# Patient Record
Sex: FEMALE | Race: WHITE | HISPANIC OR LATINO | Employment: UNEMPLOYED | URBAN - METROPOLITAN AREA
[De-identification: names, ages, dates, MRNs, and addresses within clinical notes are randomized per-mention and may not be internally consistent; named-entity substitution may affect disease eponyms.]

---

## 2019-10-21 ENCOUNTER — INITIAL PRENATAL (OUTPATIENT)
Dept: OBSTETRICS AND GYNECOLOGY | Facility: CLINIC | Age: 34
End: 2019-10-21
Payer: MEDICAID

## 2019-10-21 VITALS — SYSTOLIC BLOOD PRESSURE: 124 MMHG | DIASTOLIC BLOOD PRESSURE: 82 MMHG

## 2019-10-21 DIAGNOSIS — Z3A.12 12 WEEKS GESTATION OF PREGNANCY: ICD-10-CM

## 2019-10-21 DIAGNOSIS — O26.91 PREGNANCY, COMPLICATIONS OF, FIRST TRIMESTER: Primary | ICD-10-CM

## 2019-10-21 DIAGNOSIS — O34.219 PREVIOUS CESAREAN SECTION COMPLICATING PREGNANCY: Primary | ICD-10-CM

## 2019-10-21 PROCEDURE — 99999 PR PBB SHADOW E&M-NEW PATIENT-LVL III: CPT | Mod: PBBFAC,,, | Performed by: OBSTETRICS & GYNECOLOGY

## 2019-10-21 PROCEDURE — 99204 PR OFFICE/OUTPT VISIT, NEW, LEVL IV, 45-59 MIN: ICD-10-PCS | Mod: TH,S$PBB,, | Performed by: OBSTETRICS & GYNECOLOGY

## 2019-10-21 PROCEDURE — 99999 PR PBB SHADOW E&M-NEW PATIENT-LVL III: ICD-10-PCS | Mod: PBBFAC,,, | Performed by: OBSTETRICS & GYNECOLOGY

## 2019-10-21 PROCEDURE — 99204 OFFICE O/P NEW MOD 45 MIN: CPT | Mod: TH,S$PBB,, | Performed by: OBSTETRICS & GYNECOLOGY

## 2019-10-21 PROCEDURE — 99203 OFFICE O/P NEW LOW 30 MIN: CPT | Mod: PBBFAC,TH | Performed by: OBSTETRICS & GYNECOLOGY

## 2019-10-21 RX ORDER — FOLIC ACID 1 MG/1
1 TABLET ORAL DAILY
Status: ON HOLD | COMMUNITY
End: 2020-04-05 | Stop reason: HOSPADM

## 2019-10-21 NOTE — PROGRESS NOTES
Pt had confirmation and PN labs at [facility] in [place].  Pt desires to continue care there as long as possible.  Pt needs dating sono.  Pt states she kept track of her LMP on her phone and feels she is sure of the date.  Preg complicated by prior Csec done for lack of dilation in [place].  Pt denies any current health problems.  Pt denies any complication with prior preg.  Radhika interpretor used.    Dating sono scheduled for tomorrow  Anatomy scan scheduled for 7 wks from now  Pt given return appt to this clinic with me at 28 wks

## 2019-10-22 ENCOUNTER — EVALUATION (OUTPATIENT)
Dept: MATERNAL FETAL MEDICINE | Facility: CLINIC | Age: 34
End: 2019-10-22
Payer: MEDICAID

## 2019-10-22 DIAGNOSIS — Z36.89 ENCOUNTER TO ESTABLISH GESTATIONAL AGE USING ULTRASOUND: ICD-10-CM

## 2019-10-22 DIAGNOSIS — O26.91 PREGNANCY, COMPLICATIONS OF, FIRST TRIMESTER: ICD-10-CM

## 2019-10-22 PROCEDURE — 76801 OB US < 14 WKS SINGLE FETUS: CPT | Mod: 26,S$PBB,, | Performed by: OBSTETRICS & GYNECOLOGY

## 2019-10-22 PROCEDURE — 76801 PR US, OB <14WKS, TRANSABD, SINGLE GESTATION: ICD-10-PCS | Mod: 26,S$PBB,, | Performed by: OBSTETRICS & GYNECOLOGY

## 2019-10-22 PROCEDURE — 99499 UNLISTED E&M SERVICE: CPT | Mod: S$PBB,,, | Performed by: OBSTETRICS & GYNECOLOGY

## 2019-10-22 PROCEDURE — 76801 OB US < 14 WKS SINGLE FETUS: CPT | Mod: PBBFAC,PO | Performed by: OBSTETRICS & GYNECOLOGY

## 2019-10-22 PROCEDURE — 99499 NO LOS: ICD-10-PCS | Mod: S$PBB,,, | Performed by: OBSTETRICS & GYNECOLOGY

## 2019-12-10 ENCOUNTER — PROCEDURE VISIT (OUTPATIENT)
Dept: MATERNAL FETAL MEDICINE | Facility: CLINIC | Age: 34
End: 2019-12-10
Payer: MEDICAID

## 2019-12-10 DIAGNOSIS — Z36.89 ENCOUNTER FOR FETAL ANATOMIC SURVEY: Primary | ICD-10-CM

## 2019-12-10 DIAGNOSIS — O34.219 PREVIOUS CESAREAN SECTION COMPLICATING PREGNANCY: ICD-10-CM

## 2019-12-10 PROCEDURE — 76811 OB US DETAILED SNGL FETUS: CPT | Mod: PBBFAC,PO | Performed by: PEDIATRICS

## 2019-12-10 PROCEDURE — 99499 UNLISTED E&M SERVICE: CPT | Mod: S$PBB,,, | Performed by: PEDIATRICS

## 2019-12-10 PROCEDURE — 99499 NO LOS: ICD-10-PCS | Mod: S$PBB,,, | Performed by: PEDIATRICS

## 2019-12-10 PROCEDURE — 76811 PR US, OB FETAL EVAL & EXAM, TRANSABDOM,FIRST GESTATION: ICD-10-PCS | Mod: 26,S$PBB,, | Performed by: PEDIATRICS

## 2019-12-10 PROCEDURE — 76811 OB US DETAILED SNGL FETUS: CPT | Mod: 26,S$PBB,, | Performed by: PEDIATRICS

## 2020-01-13 ENCOUNTER — PROCEDURE VISIT (OUTPATIENT)
Dept: MATERNAL FETAL MEDICINE | Facility: CLINIC | Age: 35
End: 2020-01-13
Payer: MEDICAID

## 2020-01-13 VITALS — WEIGHT: 194.25 LBS | DIASTOLIC BLOOD PRESSURE: 84 MMHG | SYSTOLIC BLOOD PRESSURE: 130 MMHG

## 2020-01-13 DIAGNOSIS — O35.EXX0 FETAL RENAL ANOMALY, SINGLE GESTATION: ICD-10-CM

## 2020-01-13 DIAGNOSIS — Z36.89 ENCOUNTER FOR FETAL ANATOMIC SURVEY: ICD-10-CM

## 2020-01-13 PROCEDURE — 76816 PR  US,PREGNANT UTERUS,F/U,TRANSABD APP: ICD-10-PCS | Mod: 26,S$PBB,, | Performed by: OBSTETRICS & GYNECOLOGY

## 2020-01-13 PROCEDURE — 99213 PR OFFICE/OUTPT VISIT, EST, LEVL III, 20-29 MIN: ICD-10-PCS | Mod: 25,S$PBB,TH, | Performed by: OBSTETRICS & GYNECOLOGY

## 2020-01-13 PROCEDURE — 76816 OB US FOLLOW-UP PER FETUS: CPT | Mod: PBBFAC,PO | Performed by: OBSTETRICS & GYNECOLOGY

## 2020-01-13 PROCEDURE — 76816 OB US FOLLOW-UP PER FETUS: CPT | Mod: 26,S$PBB,, | Performed by: OBSTETRICS & GYNECOLOGY

## 2020-01-13 PROCEDURE — 99213 OFFICE O/P EST LOW 20 MIN: CPT | Mod: 25,S$PBB,TH, | Performed by: OBSTETRICS & GYNECOLOGY

## 2020-01-13 NOTE — PROGRESS NOTES
Indication  ========    Follow-up evaluation of anatomy. Evaluation of fetal growth    Pregnancy History  ==============    No labs on file    Maternal Assessment  =================    Weight 88 kg  Weight (lb) 194 lb  BP syst 130 mmHg  BP diast 84 mmHg    Method  ======    2D Color Doppler, Voluson E6, Transabdominal ultrasound examination. View: Suboptimal view: limited by fetal position    Pregnancy  =========    Mcgee pregnancy. Number of fetuses: 1    Dating  ======    LMP on: 7/29/2019  Cycle: regular cycle,  GA by LMP 24 w + 0 d  JACOB by LMP: 5/4/2020  Ultrasound examination on: 1/13/2020  GA by U/S based upon: AC, BPD, Femur, HC  GA by U/S 24 w + 1 d  JACOB by U/S: 5/3/2020  Assigned: based on ultrasound (CRL), selected on 10/22/2019  Assigned GA 23 w + 6 d  Assigned JACOB: 5/5/2020  Pregnancy length 280 d    General Evaluation  ==============    Cardiac activity present.  bpm.  Fetal movements visualized.  Presentation cephalic.  Placenta anterior.  Umbilical cord 3 vessel cord.  Amniotic fluid MVP 6.3 cm.    Fetal Biometry  ============    Standard  BPD 53.4 mm  22w 2d                Hadlock    OFD 78.4 mm  25w 4d                Kenneth    .2 mm  23w 5d                Hadlock    .6 mm  25w 2d                Hadlock    Femur 46.5 mm  25w 3d                Hadlock    Humerus 42.5 mm  25w 4d                Kenneth    HC / AC 1.04     g          67%        Idris    EFW (lb) 1 lb  EFW (oz) 11 oz  EFW by: Hadlock (BPD-HC-AC-FL)  Extended  Lt Renal pelvis ap 9.6 mm    Head / Face / Neck  Cephalic index 0.68    Extremities / Bony Struc  FL / BPD 0.87    FL / HC 0.22    FL / AC 0.22    Other Structures   bpm    Fetal Anatomy  ===========    Cranium: normal  Lips: suboptimal  Nose: suboptimal  4-chamber view: normal  RVOT view: suboptimal  LVOT view: suboptimal  Heart / Thorax  Aortic arch view: suboptimal  Ductal arch view: normal  SVC: normal  IVC: normal  3-vessel  view: normal  3-vessel-trachea view: suboptimal  Rt lung: normal  Lt lung: normal  Diaphragm: normal  Stomach: normal  Bladder: normal  Abdomen  Rt kidney: normal  Lt kidney: abnormal  Lt kidney: hydronephrosis  Cervical spine: normal  Thoracic spine: normal  Lumbar spine: normal  Sacral spine: normal  Lt arm: normal  Lt hand: normal  Extremities / Skeleton: Position of Left hand: WNL  Gender: female  Wants to know gender: yes  Other: A full anatomic survey has been previously performed.    Consultation  ==========    Type: ultrasound findings  The patient indicated that she was unaware of the pyelectasis seen on the prior ultrasound (left 5.1 mm). Today, unilateral fetal hydronephrosis  was noted on the fetal left measuring 9.6 mm with some calyceal dilation. The right kidney appears normal amd the fetal bladder appears  normal. The amniotic fluid volume is normal. This pelvic dilation is worse than on the prior ultrasound. I discussed with the patient that  hydronephrosis can occur for various reasons including but not limited to reflux, obstruction, fetal renal abnormality, chromosomal or genetic  condition. The patient had low risk screening for aneuploidy that was scanned in under the media tab. This makes trisomy 21 less likely. I  discussed with her the potential for the kidney dilation to stay the same, worsen or improve. Given the fact that it has worsened from the prior  scan and is more consistent with hydronephrosis, I would anticipate that the  will need follow up post delivery. I discussed with her that  we will reassess the fetal kidney in approximately 4-5 weeks. If there is still evidence of hydronephrosis, am  urology appointment  could be considered if the patient desires. If this dilation persists as pregnancy progresses,  follow-up would be warranted. A   ultrasound would be recommended prior to discharge from the nursery as well as appropriate follow-up with  pediatric urology and possible  antibiotic prophylaxis. The primary OB should notify the pediatricians at delivery if this persists. Additionally, the patient is aware that at time  surgical intervention is required or other imaging studies may be needed. There can be a potential affect on kidney function. 25 min was spent  face-to-face time with greater than half that time spent in counseling and coordination of care. An  was used for the consultation.    Impression  =========    Mcgee live intrauterine pregnancy.  Overall normal fetal growth.  Normal amniotic fluid volume.  Left fetal hydronephrosis-renal pelvis measures 9.6 mm and there is calyceal dilation. Right kidney appears normal. Fetal bladder appears  normal.  Some of the fetal anatomy was suboptimally visualized. The remainder of the fetal anatomy that was seen appeared normal.  Placenta is anterior with some lakes. No previa noted on prior ultrasound.    Recommendation  ==============    Follow up ultrasound in 4-5 weeks.  If hydronephrosis persists, primary ob to notify pediatricians at delivery so that  follow up can be performed.  See note above.

## 2020-02-10 ENCOUNTER — ROUTINE PRENATAL (OUTPATIENT)
Dept: OBSTETRICS AND GYNECOLOGY | Facility: CLINIC | Age: 35
End: 2020-02-10
Payer: MEDICAID

## 2020-02-10 VITALS — SYSTOLIC BLOOD PRESSURE: 126 MMHG | WEIGHT: 196 LBS | DIASTOLIC BLOOD PRESSURE: 81 MMHG

## 2020-02-10 DIAGNOSIS — O34.219 HISTORY OF CESAREAN SECTION COMPLICATING PREGNANCY: ICD-10-CM

## 2020-02-10 DIAGNOSIS — Z3A.28 28 WEEKS GESTATION OF PREGNANCY: Primary | ICD-10-CM

## 2020-02-10 PROCEDURE — 99213 PR OFFICE/OUTPT VISIT, EST, LEVL III, 20-29 MIN: ICD-10-PCS | Mod: TH,S$PBB,, | Performed by: OBSTETRICS & GYNECOLOGY

## 2020-02-10 PROCEDURE — 99213 OFFICE O/P EST LOW 20 MIN: CPT | Mod: TH,S$PBB,, | Performed by: OBSTETRICS & GYNECOLOGY

## 2020-02-10 PROCEDURE — 99999 PR PBB SHADOW E&M-EST. PATIENT-LVL II: CPT | Mod: PBBFAC,,, | Performed by: OBSTETRICS & GYNECOLOGY

## 2020-02-10 PROCEDURE — 99212 OFFICE O/P EST SF 10 MIN: CPT | Mod: PBBFAC,TH | Performed by: OBSTETRICS & GYNECOLOGY

## 2020-02-10 PROCEDURE — 99999 PR PBB SHADOW E&M-EST. PATIENT-LVL II: ICD-10-PCS | Mod: PBBFAC,,, | Performed by: OBSTETRICS & GYNECOLOGY

## 2020-02-10 NOTE — PROGRESS NOTES
Routine OB, patient has no complaints. Protien 3+  Patient is aware that she needs to complete her Glucose test

## 2020-02-12 ENCOUNTER — LAB VISIT (OUTPATIENT)
Dept: LAB | Facility: HOSPITAL | Age: 35
End: 2020-02-12
Attending: OBSTETRICS & GYNECOLOGY
Payer: MEDICAID

## 2020-02-12 ENCOUNTER — PROCEDURE VISIT (OUTPATIENT)
Dept: MATERNAL FETAL MEDICINE | Facility: CLINIC | Age: 35
End: 2020-02-12
Payer: MEDICAID

## 2020-02-12 VITALS — SYSTOLIC BLOOD PRESSURE: 124 MMHG | DIASTOLIC BLOOD PRESSURE: 84 MMHG | WEIGHT: 195.56 LBS

## 2020-02-12 DIAGNOSIS — Z3A.28 28 WEEKS GESTATION OF PREGNANCY: ICD-10-CM

## 2020-02-12 LAB
ABO + RH BLD: NORMAL
BASOPHILS # BLD AUTO: 0.08 K/UL (ref 0–0.2)
BASOPHILS NFR BLD: 0.7 % (ref 0–1.9)
BLD GP AB SCN CELLS X3 SERPL QL: NORMAL
DIFFERENTIAL METHOD: ABNORMAL
EOSINOPHIL # BLD AUTO: 0.1 K/UL (ref 0–0.5)
EOSINOPHIL NFR BLD: 0.7 % (ref 0–8)
ERYTHROCYTE [DISTWIDTH] IN BLOOD BY AUTOMATED COUNT: 13.4 % (ref 11.5–14.5)
GLUCOSE SERPL-MCNC: 246 MG/DL (ref 70–140)
HCT VFR BLD AUTO: 38.6 % (ref 37–48.5)
HGB BLD-MCNC: 12.8 G/DL (ref 12–16)
IMM GRANULOCYTES # BLD AUTO: 0.43 K/UL (ref 0–0.04)
IMM GRANULOCYTES NFR BLD AUTO: 3.6 % (ref 0–0.5)
LYMPHOCYTES # BLD AUTO: 2.6 K/UL (ref 1–4.8)
LYMPHOCYTES NFR BLD: 21.4 % (ref 18–48)
MCH RBC QN AUTO: 30.5 PG (ref 27–31)
MCHC RBC AUTO-ENTMCNC: 33.2 G/DL (ref 32–36)
MCV RBC AUTO: 92 FL (ref 82–98)
MONOCYTES # BLD AUTO: 0.7 K/UL (ref 0.3–1)
MONOCYTES NFR BLD: 5.5 % (ref 4–15)
NEUTROPHILS # BLD AUTO: 8.1 K/UL (ref 1.8–7.7)
NEUTROPHILS NFR BLD: 68.1 % (ref 38–73)
NRBC BLD-RTO: 0 /100 WBC
PLATELET # BLD AUTO: 262 K/UL (ref 150–350)
PMV BLD AUTO: 8.9 FL (ref 9.2–12.9)
RBC # BLD AUTO: 4.2 M/UL (ref 4–5.4)
WBC # BLD AUTO: 11.94 K/UL (ref 3.9–12.7)

## 2020-02-12 PROCEDURE — 85025 COMPLETE CBC W/AUTO DIFF WBC: CPT

## 2020-02-12 PROCEDURE — 86850 RBC ANTIBODY SCREEN: CPT

## 2020-02-12 PROCEDURE — 76816 PR  US,PREGNANT UTERUS,F/U,TRANSABD APP: ICD-10-PCS | Mod: 26,S$PBB,, | Performed by: PEDIATRICS

## 2020-02-12 PROCEDURE — 76816 OB US FOLLOW-UP PER FETUS: CPT | Mod: PBBFAC,PO | Performed by: PEDIATRICS

## 2020-02-12 PROCEDURE — 82950 GLUCOSE TEST: CPT

## 2020-02-12 PROCEDURE — 99214 PR OFFICE/OUTPT VISIT, EST, LEVL IV, 30-39 MIN: ICD-10-PCS | Mod: TH,25,S$PBB, | Performed by: PEDIATRICS

## 2020-02-12 PROCEDURE — 76816 OB US FOLLOW-UP PER FETUS: CPT | Mod: 26,S$PBB,, | Performed by: PEDIATRICS

## 2020-02-12 PROCEDURE — 36415 COLL VENOUS BLD VENIPUNCTURE: CPT

## 2020-02-12 PROCEDURE — 99214 OFFICE O/P EST MOD 30 MIN: CPT | Mod: TH,25,S$PBB, | Performed by: PEDIATRICS

## 2020-02-12 NOTE — PROGRESS NOTES
Indication  ========    Evaluation of fetal growth. Follow-up evaluation of anatomy    Pregnancy History  ==============    No labs on file    Maternal Assessment  =================    Weight 88 kg  Weight (lb) 194 lb  BP syst 145 mmHg  BP diast 77 mmHg  Maternal assessment other: Repeat BP: 124/84    Method  ======    Transabdominal ultrasound examination, 2D Color Doppler, Voluson E6. View: Suboptimal view: limited by fetal position    Pregnancy  =========    Mcgee pregnancy. Number of fetuses: 1    Dating  ======    LMP on: 7/29/2019  Cycle: regular cycle,  GA by LMP 28 w + 2 d  JACOB by LMP: 5/4/2020  Ultrasound examination on: 2/12/2020  GA by U/S based upon: AC, BPD, Femur, HC  GA by U/S 29 w + 2 d  JACOB by U/S: 4/27/2020  Assigned: based on ultrasound (CRL), selected on 10/22/2019  Assigned GA 28 w + 1 d  Assigned JACOB: 5/5/2020  Pregnancy length 280 d    General Evaluation  ==============    Cardiac activity present.  bpm.  Fetal movements visualized.  Presentation breech.  Placenta anterior.  Umbilical cord 3 vessel cord.  Amniotic fluid MVP 5.5 cm.    Fetal Biometry  ============    Standard  BPD 67.0 mm  27w 0d                Hadlock    OFD 97.1 mm  31w 3d                Kenneth    .7 mm  29w 5d                Hadlock    .1 mm  30w 1d                Hadlock    Femur 57.1 mm  30w 0d                Hadlock    HC / AC 1.04    EFW 1,455 g          64%        Idris    EFW (lb) 3 lb  EFW (oz) 3 oz  EFW by: Hadlock (BPD-HC-AC-FL)  Extended  Lt Renal pelvis ap 15.8 mm    Head / Face / Neck  Cephalic index 0.69    Extremities / Bony Struc  FL / BPD 0.85    FL / HC 0.21    FL / AC 0.22    Other Structures   bpm    Fetal Anatomy  ============    Cranium: normal  Lips: normal  Nose: normal  4-chamber view: documented previously  RVOT view: suboptimal  LVOT view: suboptimal  Heart / Thorax  Aortic arch view: normal  3-vessel-trachea  view: normal  Stomach: normal  Bladder: normal  Abdomen  Rt kidney: normal  Lt kidney: abnormal  Lt kidney: hydronephrosis  Gender: female  Wants to know gender: yes    Consultation  ==========    Ms. Champ King returns today for re-evaluation of fetal growth and left kidney. Completion of anatomy was also attempted but some cardiac  views were not obtained. Pyelectasis was noted on a read only scan in December. The patient returned to our office on  -see Dr. Wahl's consult. The patient did not know that she had pyelectasis. on the January scan, the left kidney was noted to have hydronephrosis.  Today, it has progressed. The renal pelvis measures 15.8 mm. There is calyceal involvement but no obvious signs of dysplastic changes as of  this time.    Utilizing an , I discussed the findings with Ms. King. She understands from her previous discussion that hydronephrosis rarely  regresses and that her baby is likely to require intervention postnatally. Prenatal appointment with pediatric urology/nephrology is discouraged  by those departments. I explained this to Ms. King, and she understands that she will need to have her pediatrician and neonatology involved  in the  assessment which will then require a consultation with Pediatric Urology. I did explain to her that the right kidney appears  normal and healthy. She understands that only 1 healthy kidney is required for a healthy existence. I also explained to her that her history of  renal infections was not the cause as of the fetal disorder.    All questions were answered, and the patient appeared to be satisfied with her information. She will return in approximately 6 weeks for growth  and additional evaluation of fetal renal status.      I spent 25 min in patient care management and consultation with greater than 50% face-to-face.      Impression  =========    Fetal size is AGA with the EFW at the 64th percentile. Cardiac outflow  tracts still NWS.  Normal repeat limited fetal anatomic survey.    AFV is normal.      Recommendation  ==============    Repeat growth and anatomical assessment (cardiac views and left kidney) in 5 - 6 weeks'  Notify Pediatrician/Neonatology of  assessment needs

## 2020-02-18 ENCOUNTER — ROUTINE PRENATAL (OUTPATIENT)
Dept: OBSTETRICS AND GYNECOLOGY | Facility: CLINIC | Age: 35
End: 2020-02-18
Payer: MEDICAID

## 2020-02-18 VITALS — SYSTOLIC BLOOD PRESSURE: 132 MMHG | WEIGHT: 196 LBS | DIASTOLIC BLOOD PRESSURE: 83 MMHG

## 2020-02-18 DIAGNOSIS — O24.419 GESTATIONAL DIABETES MELLITUS (GDM) AFFECTING SECOND PREGNANCY: ICD-10-CM

## 2020-02-18 DIAGNOSIS — Z3A.29 29 WEEKS GESTATION OF PREGNANCY: ICD-10-CM

## 2020-02-18 DIAGNOSIS — O34.219 HISTORY OF CESAREAN SECTION COMPLICATING PREGNANCY: Primary | ICD-10-CM

## 2020-02-18 PROCEDURE — 99213 PR OFFICE/OUTPT VISIT, EST, LEVL III, 20-29 MIN: ICD-10-PCS | Mod: TH,S$PBB,, | Performed by: OBSTETRICS & GYNECOLOGY

## 2020-02-18 PROCEDURE — 99999 PR PBB SHADOW E&M-EST. PATIENT-LVL II: CPT | Mod: PBBFAC,,, | Performed by: OBSTETRICS & GYNECOLOGY

## 2020-02-18 PROCEDURE — 99213 OFFICE O/P EST LOW 20 MIN: CPT | Mod: TH,S$PBB,, | Performed by: OBSTETRICS & GYNECOLOGY

## 2020-02-18 PROCEDURE — 99212 OFFICE O/P EST SF 10 MIN: CPT | Mod: PBBFAC,TH | Performed by: OBSTETRICS & GYNECOLOGY

## 2020-02-18 PROCEDURE — 99999 PR PBB SHADOW E&M-EST. PATIENT-LVL II: ICD-10-PCS | Mod: PBBFAC,,, | Performed by: OBSTETRICS & GYNECOLOGY

## 2020-02-18 NOTE — PROGRESS NOTES
Pt here to discuss recent 1 hr DM screen = 246.  Discussed 3 hr test not need to confirm Gest DM.  Discussed low carb diet; Rx and info for glucometer, lancets and testing strips qid.  Pt to log all BS results and f/u in 1 wk after modifying diet.

## 2020-02-21 ENCOUNTER — CLINICAL SUPPORT (OUTPATIENT)
Dept: OBSTETRICS AND GYNECOLOGY | Facility: CLINIC | Age: 35
End: 2020-02-21
Payer: MEDICAID

## 2020-02-21 ENCOUNTER — ROUTINE PRENATAL (OUTPATIENT)
Dept: OBSTETRICS AND GYNECOLOGY | Facility: CLINIC | Age: 35
End: 2020-02-21
Payer: MEDICAID

## 2020-02-21 VITALS
SYSTOLIC BLOOD PRESSURE: 134 MMHG | WEIGHT: 198 LBS | WEIGHT: 198 LBS | SYSTOLIC BLOOD PRESSURE: 134 MMHG | DIASTOLIC BLOOD PRESSURE: 82 MMHG | DIASTOLIC BLOOD PRESSURE: 82 MMHG

## 2020-02-21 DIAGNOSIS — Z3A.29 29 WEEKS GESTATION OF PREGNANCY: ICD-10-CM

## 2020-02-21 DIAGNOSIS — Z23 NEED FOR DIPHTHERIA-TETANUS-PERTUSSIS (TDAP) VACCINE: ICD-10-CM

## 2020-02-21 DIAGNOSIS — O24.415 GESTATIONAL DIABETES MELLITUS (GDM) IN THIRD TRIMESTER CONTROLLED ON ORAL HYPOGLYCEMIC DRUG: Primary | ICD-10-CM

## 2020-02-21 DIAGNOSIS — Z23 NEED FOR TDAP VACCINATION: Primary | ICD-10-CM

## 2020-02-21 PROCEDURE — 99999 PR PBB SHADOW E&M-EST. PATIENT-LVL II: CPT | Mod: PBBFAC,,,

## 2020-02-21 PROCEDURE — 90471 IMMUNIZATION ADMIN: CPT | Mod: PBBFAC,VFC

## 2020-02-21 PROCEDURE — 99212 OFFICE O/P EST SF 10 MIN: CPT | Mod: PBBFAC,25

## 2020-02-21 PROCEDURE — 99999 PR PBB SHADOW E&M-EST. PATIENT-LVL II: CPT | Mod: PBBFAC,,, | Performed by: OBSTETRICS & GYNECOLOGY

## 2020-02-21 PROCEDURE — 99999 PR PBB SHADOW E&M-EST. PATIENT-LVL II: ICD-10-PCS | Mod: PBBFAC,,, | Performed by: OBSTETRICS & GYNECOLOGY

## 2020-02-21 PROCEDURE — 99213 OFFICE O/P EST LOW 20 MIN: CPT | Mod: TH,S$PBB,, | Performed by: OBSTETRICS & GYNECOLOGY

## 2020-02-21 PROCEDURE — 99213 PR OFFICE/OUTPT VISIT, EST, LEVL III, 20-29 MIN: ICD-10-PCS | Mod: TH,S$PBB,, | Performed by: OBSTETRICS & GYNECOLOGY

## 2020-02-21 PROCEDURE — 99999 PR PBB SHADOW E&M-EST. PATIENT-LVL II: ICD-10-PCS | Mod: PBBFAC,,,

## 2020-02-21 PROCEDURE — 99212 OFFICE O/P EST SF 10 MIN: CPT | Mod: PBBFAC,25,27,TH | Performed by: OBSTETRICS & GYNECOLOGY

## 2020-02-21 RX ORDER — METFORMIN HYDROCHLORIDE 500 MG/1
500 TABLET ORAL 2 TIMES DAILY WITH MEALS
Qty: 60 TABLET | Refills: 11 | Status: ON HOLD | OUTPATIENT
Start: 2020-02-21 | End: 2020-04-05 | Stop reason: HOSPADM

## 2020-02-21 NOTE — PROGRESS NOTES
PT'S ORIGINAL APPT WAS @  8307   , PT ARRIVED @  1420    , PT WAS ROOMED @  0666 ,, PT  HAS SEEN HER DR FIRST  Pt here for T- DAP injection, explained what the medication is for, reviewed documentation of medication with pt, injection given via L  Deltoid w/o difficulty. Pt stated her understanding of all instructions.

## 2020-02-21 NOTE — PROGRESS NOTES
Pt has been recording BS after changing diet; all fields elevated, will start metformin 500 mg bid.

## 2020-02-27 ENCOUNTER — TELEPHONE (OUTPATIENT)
Dept: MATERNAL FETAL MEDICINE | Facility: CLINIC | Age: 35
End: 2020-02-27

## 2020-02-27 DIAGNOSIS — O24.419 GESTATIONAL DIABETES MELLITUS (GDM) AFFECTING SECOND PREGNANCY: Primary | ICD-10-CM

## 2020-02-27 NOTE — TELEPHONE ENCOUNTER
With the assistance of an [facility] , discussed with patient that the appointments for 3/3/20 and 3/4/20 could not be combined for the same day. Patient requested to reschedule to the following Monday 3/9/20 and MFM consult rescheduled to 3/9/20 at 940a prior to Dr. Jordan's OB visit. Patient instructed to bring in BS log for review by MFM. Pt verbalized understanding of information.

## 2020-02-27 NOTE — TELEPHONE ENCOUNTER
Message left for pt to call MFM clinic at [phone] regarding MFM consult and OB visit for next week on different days. Dr. Jordan not in clinic on 3/4/20. Plan to confirm with patient that she can come on 2 separate days or reschedule if needed.

## 2020-03-03 ENCOUNTER — ROUTINE PRENATAL (OUTPATIENT)
Dept: OBSTETRICS AND GYNECOLOGY | Facility: CLINIC | Age: 35
End: 2020-03-03
Payer: MEDICAID

## 2020-03-03 VITALS — WEIGHT: 196 LBS | DIASTOLIC BLOOD PRESSURE: 88 MMHG | SYSTOLIC BLOOD PRESSURE: 125 MMHG

## 2020-03-03 DIAGNOSIS — O34.219 HISTORY OF CESAREAN SECTION COMPLICATING PREGNANCY: ICD-10-CM

## 2020-03-03 DIAGNOSIS — Z3A.31 31 WEEKS GESTATION OF PREGNANCY: ICD-10-CM

## 2020-03-03 DIAGNOSIS — O24.419 GESTATIONAL DIABETES MELLITUS (GDM) AFFECTING SECOND PREGNANCY: Primary | ICD-10-CM

## 2020-03-03 PROCEDURE — 99213 OFFICE O/P EST LOW 20 MIN: CPT | Mod: TH,S$PBB,, | Performed by: OBSTETRICS & GYNECOLOGY

## 2020-03-03 PROCEDURE — 99212 OFFICE O/P EST SF 10 MIN: CPT | Mod: PBBFAC,TH | Performed by: OBSTETRICS & GYNECOLOGY

## 2020-03-03 PROCEDURE — 99999 PR PBB SHADOW E&M-EST. PATIENT-LVL II: ICD-10-PCS | Mod: PBBFAC,,, | Performed by: OBSTETRICS & GYNECOLOGY

## 2020-03-03 PROCEDURE — 99999 PR PBB SHADOW E&M-EST. PATIENT-LVL II: CPT | Mod: PBBFAC,,, | Performed by: OBSTETRICS & GYNECOLOGY

## 2020-03-03 PROCEDURE — 99213 PR OFFICE/OUTPT VISIT, EST, LEVL III, 20-29 MIN: ICD-10-PCS | Mod: TH,S$PBB,, | Performed by: OBSTETRICS & GYNECOLOGY

## 2020-03-03 NOTE — PROGRESS NOTES
Pt denies any c/o; doing well, taking metformin 500 mg bid; overall blood sugar control is very good, very few abnormals since last visit

## 2020-03-09 ENCOUNTER — PROCEDURE VISIT (OUTPATIENT)
Dept: MATERNAL FETAL MEDICINE | Facility: CLINIC | Age: 35
End: 2020-03-09
Payer: MEDICAID

## 2020-03-09 ENCOUNTER — INITIAL CONSULT (OUTPATIENT)
Dept: MATERNAL FETAL MEDICINE | Facility: CLINIC | Age: 35
End: 2020-03-09
Payer: MEDICAID

## 2020-03-09 VITALS — DIASTOLIC BLOOD PRESSURE: 88 MMHG | SYSTOLIC BLOOD PRESSURE: 128 MMHG | WEIGHT: 200.63 LBS

## 2020-03-09 DIAGNOSIS — O24.419 GESTATIONAL DIABETES MELLITUS (GDM) AFFECTING SECOND PREGNANCY: ICD-10-CM

## 2020-03-09 DIAGNOSIS — O34.219 HISTORY OF CESAREAN SECTION COMPLICATING PREGNANCY: ICD-10-CM

## 2020-03-09 DIAGNOSIS — Z3A.29 29 WEEKS GESTATION OF PREGNANCY: ICD-10-CM

## 2020-03-09 PROCEDURE — 76816 OB US FOLLOW-UP PER FETUS: CPT | Mod: 26,S$PBB,, | Performed by: OBSTETRICS & GYNECOLOGY

## 2020-03-09 PROCEDURE — 76816 PR  US,PREGNANT UTERUS,F/U,TRANSABD APP: ICD-10-PCS | Mod: 26,S$PBB,, | Performed by: OBSTETRICS & GYNECOLOGY

## 2020-03-09 PROCEDURE — 76819 FETAL BIOPHYS PROFIL W/O NST: CPT | Mod: 26,S$PBB,, | Performed by: OBSTETRICS & GYNECOLOGY

## 2020-03-09 PROCEDURE — 99212 OFFICE O/P EST SF 10 MIN: CPT | Mod: PBBFAC,TH,PO,25 | Performed by: OBSTETRICS & GYNECOLOGY

## 2020-03-09 PROCEDURE — 76819 FETAL BIOPHYS PROFIL W/O NST: CPT | Mod: PBBFAC,PO | Performed by: OBSTETRICS & GYNECOLOGY

## 2020-03-09 PROCEDURE — 76819 PR US, OB, FETAL BIOPHYSICAL, W/O NST: ICD-10-PCS | Mod: 26,S$PBB,, | Performed by: OBSTETRICS & GYNECOLOGY

## 2020-03-09 PROCEDURE — 76816 OB US FOLLOW-UP PER FETUS: CPT | Mod: PBBFAC,PO | Performed by: OBSTETRICS & GYNECOLOGY

## 2020-03-09 PROCEDURE — 99214 PR OFFICE/OUTPT VISIT, EST, LEVL IV, 30-39 MIN: ICD-10-PCS | Mod: S$PBB,TH,25, | Performed by: OBSTETRICS & GYNECOLOGY

## 2020-03-09 PROCEDURE — 99999 PR PBB SHADOW E&M-EST. PATIENT-LVL II: ICD-10-PCS | Mod: PBBFAC,,, | Performed by: OBSTETRICS & GYNECOLOGY

## 2020-03-09 PROCEDURE — 99999 PR PBB SHADOW E&M-EST. PATIENT-LVL II: CPT | Mod: PBBFAC,,, | Performed by: OBSTETRICS & GYNECOLOGY

## 2020-03-09 PROCEDURE — 99214 OFFICE O/P EST MOD 30 MIN: CPT | Mod: S$PBB,TH,25, | Performed by: OBSTETRICS & GYNECOLOGY

## 2020-03-09 NOTE — PROGRESS NOTES
Chief complaint: Consultation for gestational diabetes  Provider requesting consultation: Dr. Jordan    34 y.o. [id number] 31w6d EGA referred for consultation regarding GDM. 1 hour was 246. Patient was instructed to check blood sugars by primary OB. Started on metformin 500 mg BID by primary OB.    Review of Systems  Review of Systems       Medication List with Changes/Refills   Current Medications    FOLIC ACID (FOLVITE) 1 MG TABLET    Take 1 mg by mouth once daily.    METFORMIN (GLUCOPHAGE) 500 MG TABLET    Take 1 tablet (500 mg total) by mouth 2 (two) times daily with meals.    PRENATAL 25/IRON FUM/FOLIC/DHA (PRENATAL-1 ORAL)    Take by mouth.     Review of patient's allergies indicates:  No Known Allergies    PMH:No past medical history on file.    PObHx:  OB History    Para Term  AB Living   2 1 1     1   SAB TAB Ectopic Multiple Live Births           1      # Outcome Date GA Lbr Hermann/2nd Weight Sex Delivery Anes PTL Lv   2 Current            1 Term 09   4.082 kg (9 lb) M CS-LTranv   VIV       PSH:  Past Surgical History:   Procedure Laterality Date     SECTION         Family history:family history includes Diabetes in her mother.    Social history: reports that she has never smoked. She has never used smokeless tobacco. She reports that she does not drink alcohol or use drugs.    Genetic history: Quad low risk.    Objective:    Physical Exam      Deferred.    F/u fetal growth and BPP performed today. EFW 44th percentile, BPP 8/8. Normal AFV. Left kidney with hydronephrosis/caliectasis. See details in imaging section of EPIC.    Recommendations:    Gestational diabetes is marked by carbohydrate intolerance in pregnancy. Gestational diabetes mellitus (GDM) is defined as insulin resistance of variable severity with onset or first recognition during pregnancy. It complicates 6-9% of all pregnancies (using current definitions) and is increasing as the prevalence of obesity continues to  rise. Appropriate diagnosis and treatment of women with GDM reduces fetal macrosomia, preeclampsia, gestational hypertension,  delivery, and composite  morbidity (shoulder dystocia, nerve palsy, and bone fracture). Neonates of women with GDM are also at increased risk for  hypoglycemia and hyperbilirubinemia as well as obesity and diabetes in adolescence and adulthood. Women diagnosed with GDM are at increased risk of developing Type 2 diabetes mellitus later in life.    I counseled the patient regarding the risks of gestational diabetes, which include macrosomia, polyhydramnios,  hypoglycemia, birth trauma, an increased risk of  delivery, decreased lung maturity, and  jaundice. Patients requiring hypoglycemia agents have an increased risk of stillbirth. She understands that  outcome is linked to her ability to achieve glycemic control.     The goal of treatment is to have a fasting blood sugar less than 95 mg/dL and 2 hour postprandials less than 120 mg/dL. Insulin is the ideal agent for treatment of GDM. The basis for this has focused on a lack of data on long-term outcomes of  maternal and childhood outcomes when women are treated with oral agents in pregnancy. However, there are no data demonstrating adverse short-term adverse maternal or  effects from oral diabetic agents in pregnancy If oral agents are used, our division utilizes metformin as the first line oral hypoglycemic agent.     Metformin inhibits hepatic gluconeogenesis and glucose absorption while also stimulating peripheral glucose uptake. Metformin crosses the placenta and fetal metformin concentrations approach those of the mother. While there are not long-term outcome data on metformin use in pregnancy, at least one study has demonstrated similar 2-year developmental outcomes when compared to insulin (Antonieta et al). There does not appear to be any differences in short-term  outcomes  for neonates whose mothers were treated with metformin compared to insulin. However, there does appear to be a reduction in the rate of hypertensive disease, but a slight increase in the rate of  birth (Qasim et al). Between 26 - 46% of women treated with metformin will ultimately require insulin for glycemic control.    Glyburide is a second-generation sulfonylurea, which works by stimulating the pancreas to release more insulin. This medication is contraindicated in those with a sulfa allergy. The major side effect of glyburide is therefore hypoglycemia. Two recent meta-analyses (Sheela et al; Qasim et al) have demonstrated worse  outcomes in women treated with glyburide compared to insulin with an increase in respiratory morbidity, macrosomia, birth injury and hypoglycemia. This is thought to be related to the fact that glyburide crosses the placenta and stimulates fetal insulin release. In clinical trials, 4-16 % of women fail glyburide and ultimately require insulin. Given the recent concerns regarding the use of glyburide for treatment of GDM, we recommend that glyburide use should be reserved only for those women who decline insulin and who are intolerant of metformin.    Antepartum testing is indicated for those patients on hypoglycemia agents to reduce the incidence of fetal demise. Fetal growth should be followed with a scan between 37 and 38 weeks to evaluate for fetal macrosomia and delivery planning.  Women diagnosed with GDM are at increased risk of developing Type 2 diabetes mellitus later in life. Postpartum glucose testing using a 75 g oral glucose tolerance test should be performed at 6-12 weeks after delivery.       Recommendations:  1. Recommend primary OB refer patient to diabetes education  2. Continue checking blood sugars 4x/day and review with primary OB. MFM will see patient again in 4 weeks. Blood sugars are well-controlled, no changes suggested to regimen.  3. A scan  should be performed at 37-38 weeks gestation to exclude macrosomia and determine further delivery management.  4. If medication is needed,  fetal surveillance is recommended at 32 weeks with delivery between 39 and 39 +6/7 weeks if well controlled. If other comorbidities or poor control, delivery is recommended between 38 and 39 weeks.  5. If EFW is 4500g or greater, recommend offering csection for delivery.  6. Patients with GDM are cured by delivery. All medications can be stopped post-partum. However, some patients with GDM have undiagnosed Type 2 diabetes. Therefore, a single post-partum fasting blood sugar can be used to screen patients for undiagnosed Type 2 diabetes prior to discharge. If ?126 mg/dL, continue monitoring patterned blood sugars and treat as needed.  7. Patients with GDM should be screened for Type 2 DM at 6-8 weeks after delivery with a 75-g, 2 hour glucose tolerance test. This testing should be scheduled when the patient returns for her post-partum exam or the patient should be instructed to follow-up with her primary care physician. For women who are exclusively breastfeeding, it is reasonable to wait until they are done lactating prior to performing screening. They also require life-long monitoring for Type 2 diabetes and should be instructed on the importance of continued care with their primary care physician for periodic retesting and initiation of lifestyle modification with diet and exercise.    Fetal renal anomaly  Previously counseled by Dr. Mcmahon and Dr. Wahl. Reviewed findings from today's ultrasound which are consistent with hydronephrosis/caliectasis is visualized. Primary OB should communicate prenatal ultrasound findings to pediatrics at delivery.  will need a renal ultrasound prior to discharge from the hospital and follow-up with pediatric urology. See previous notes for consultation recommendations.    F/u with MFM in 4 weeks: growth  ultrasound/BPP/blood sugar check. Recommend primary OB initiate 2x/week prenatal testing at 32 weeks.    The approximate physician face to face time was 25 minutes. The majority of time (greater than 50%) was spent on counseling of the patient or coordination of care.      Certified : [id number]

## 2020-03-09 NOTE — LETTER
March 9, 2020      Ronnie Jordan MD  [address] [facility] [address]  [address]  [place] [address]           [facility]  [address] [facility] [address] [place] [address]  [place] [address]  Phone: [phone]  Fax: [phone]          Patient: Marcela King   MR Number: [id number]   YOB: 1985   Date of Visit: 3/9/2020       Dear Dr. Ronnie Jordan:    Thank you for referring Marcela King to me for evaluation. Attached you will find relevant portions of my assessment and plan of care.    If you have questions, please do not hesitate to call me. I look forward to following Marcela King along with you.    Sincerely,    Ivonne Parker MD    Enclosure  CC:  No Recipients    If you would like to receive this communication electronically, please contact [email or web address] or [phone] to request more information on Epic[employer] Link access.    For providers and/or their staff who would like to refer a patient to [facility], please contact us through our one-stop-shop provider referral line, [facility], at [phone].    If you feel you have received this communication in error or would no longer like to receive these types of communications, please e-mail [email or web address]

## 2020-04-02 ENCOUNTER — HOSPITAL ENCOUNTER (INPATIENT)
Facility: HOSPITAL | Age: 35
LOS: 3 days | Discharge: HOME | End: 2020-04-05
Attending: OBSTETRICS & GYNECOLOGY | Admitting: OBSTETRICS & GYNECOLOGY
Payer: MEDICAID

## 2020-04-02 DIAGNOSIS — Z98.891 S/P REPEAT LOW TRANSVERSE C-SECTION: ICD-10-CM

## 2020-04-02 DIAGNOSIS — O42.90 ANTEPARTUM PREMATURE RUPTURE OF MEMBRANE: ICD-10-CM

## 2020-04-02 DIAGNOSIS — O13.3 GESTATIONAL HYPERTENSION, THIRD TRIMESTER: Primary | ICD-10-CM

## 2020-04-02 PROCEDURE — 99211 OFF/OP EST MAY X REQ PHY/QHP: CPT

## 2020-04-02 PROCEDURE — 11000001 HC ACUTE MED/SURG PRIVATE ROOM

## 2020-04-03 ENCOUNTER — ANESTHESIA (OUTPATIENT)
Dept: OBSTETRICS AND GYNECOLOGY | Facility: HOSPITAL | Age: 35
End: 2020-04-03
Payer: MEDICAID

## 2020-04-03 ENCOUNTER — ANESTHESIA EVENT (OUTPATIENT)
Dept: OBSTETRICS AND GYNECOLOGY | Facility: HOSPITAL | Age: 35
End: 2020-04-03
Payer: MEDICAID

## 2020-04-03 PROBLEM — Z98.891 S/P REPEAT LOW TRANSVERSE C-SECTION: Status: ACTIVE | Noted: 2020-04-02

## 2020-04-03 LAB
ABO + RH BLD: NORMAL
BASOPHILS # BLD AUTO: 0.05 K/UL (ref 0–0.2)
BASOPHILS NFR BLD: 0.5 % (ref 0–1.9)
BLD GP AB SCN CELLS X3 SERPL QL: NORMAL
DIFFERENTIAL METHOD: ABNORMAL
EOSINOPHIL # BLD AUTO: 0.1 K/UL (ref 0–0.5)
EOSINOPHIL NFR BLD: 0.9 % (ref 0–8)
ERYTHROCYTE [DISTWIDTH] IN BLOOD BY AUTOMATED COUNT: 14.1 % (ref 11.5–14.5)
HCT VFR BLD AUTO: 38.2 % (ref 37–48.5)
HGB BLD-MCNC: 12.7 G/DL (ref 12–16)
IMM GRANULOCYTES # BLD AUTO: 0.16 K/UL (ref 0–0.04)
IMM GRANULOCYTES NFR BLD AUTO: 1.6 % (ref 0–0.5)
LYMPHOCYTES # BLD AUTO: 3.5 K/UL (ref 1–4.8)
LYMPHOCYTES NFR BLD: 33.8 % (ref 18–48)
MCH RBC QN AUTO: 30.3 PG (ref 27–31)
MCHC RBC AUTO-ENTMCNC: 33.2 G/DL (ref 32–36)
MCV RBC AUTO: 91 FL (ref 82–98)
MONOCYTES # BLD AUTO: 0.9 K/UL (ref 0.3–1)
MONOCYTES NFR BLD: 8.9 % (ref 4–15)
NEUTROPHILS # BLD AUTO: 5.5 K/UL (ref 1.8–7.7)
NEUTROPHILS NFR BLD: 54.3 % (ref 38–73)
NRBC BLD-RTO: 0 /100 WBC
PLATELET # BLD AUTO: 232 K/UL (ref 150–350)
PMV BLD AUTO: 10.4 FL (ref 9.2–12.9)
POCT GLUCOSE: 115 MG/DL (ref 70–110)
RBC # BLD AUTO: 4.19 M/UL (ref 4–5.4)
RPR SER QL: NORMAL
WBC # BLD AUTO: 10.2 K/UL (ref 3.9–12.7)

## 2020-04-03 PROCEDURE — 88307 PR  SURG PATH,LEVEL V: ICD-10-PCS | Mod: 26,,, | Performed by: PATHOLOGY

## 2020-04-03 PROCEDURE — 88307 TISSUE EXAM BY PATHOLOGIST: CPT | Mod: 26,,, | Performed by: PATHOLOGY

## 2020-04-03 PROCEDURE — 25000003 PHARM REV CODE 250: Performed by: ANESTHESIOLOGY

## 2020-04-03 PROCEDURE — 63600175 PHARM REV CODE 636 W HCPCS: Performed by: NURSE ANESTHETIST, CERTIFIED REGISTERED

## 2020-04-03 PROCEDURE — 59514 PRA REAN DELIVERY ONLY: ICD-10-PCS | Mod: CRNA,,, | Performed by: NURSE ANESTHETIST, CERTIFIED REGISTERED

## 2020-04-03 PROCEDURE — 59514 CESAREAN DELIVERY ONLY: CPT | Mod: ANES,,, | Performed by: ANESTHESIOLOGY

## 2020-04-03 PROCEDURE — 27200688 HC TRAY, SPINAL-HYPER/ ISOBARIC: Performed by: ANESTHESIOLOGY

## 2020-04-03 PROCEDURE — 59514 CESAREAN DELIVERY ONLY: CPT | Mod: 80,AT,, | Performed by: OBSTETRICS & GYNECOLOGY

## 2020-04-03 PROCEDURE — 85025 COMPLETE CBC W/AUTO DIFF WBC: CPT

## 2020-04-03 PROCEDURE — 27100025 HC TUBING, SET FLUID WARMER: Performed by: NURSE ANESTHETIST, CERTIFIED REGISTERED

## 2020-04-03 PROCEDURE — 86850 RBC ANTIBODY SCREEN: CPT

## 2020-04-03 PROCEDURE — 37000009 HC ANESTHESIA EA ADD 15 MINS: Performed by: OBSTETRICS & GYNECOLOGY

## 2020-04-03 PROCEDURE — 59514 CESAREAN DELIVERY ONLY: CPT | Mod: AT,,, | Performed by: OBSTETRICS & GYNECOLOGY

## 2020-04-03 PROCEDURE — 63600175 PHARM REV CODE 636 W HCPCS: Performed by: OBSTETRICS & GYNECOLOGY

## 2020-04-03 PROCEDURE — 86592 SYPHILIS TEST NON-TREP QUAL: CPT

## 2020-04-03 PROCEDURE — S0020 INJECTION, BUPIVICAINE HYDRO: HCPCS | Performed by: ANESTHESIOLOGY

## 2020-04-03 PROCEDURE — 88307 TISSUE EXAM BY PATHOLOGIST: CPT | Performed by: PATHOLOGY

## 2020-04-03 PROCEDURE — 37000008 HC ANESTHESIA 1ST 15 MINUTES: Performed by: OBSTETRICS & GYNECOLOGY

## 2020-04-03 PROCEDURE — 59514 PRA REAN DELIVERY ONLY: ICD-10-PCS | Mod: ANES,,, | Performed by: ANESTHESIOLOGY

## 2020-04-03 PROCEDURE — 59514 CESAREAN DELIVERY ONLY: CPT | Mod: CRNA,,, | Performed by: NURSE ANESTHETIST, CERTIFIED REGISTERED

## 2020-04-03 PROCEDURE — 25000003 PHARM REV CODE 250: Performed by: OBSTETRICS & GYNECOLOGY

## 2020-04-03 PROCEDURE — 36000684 HC CESAREAN SECTION, UNSCHEDULED

## 2020-04-03 PROCEDURE — 63600175 PHARM REV CODE 636 W HCPCS: Performed by: ANESTHESIOLOGY

## 2020-04-03 PROCEDURE — 59514 PR CESAREAN DELIVERY ONLY: ICD-10-PCS | Mod: AT,,, | Performed by: OBSTETRICS & GYNECOLOGY

## 2020-04-03 PROCEDURE — 51702 INSERT TEMP BLADDER CATH: CPT

## 2020-04-03 PROCEDURE — 11000001 HC ACUTE MED/SURG PRIVATE ROOM

## 2020-04-03 PROCEDURE — 59514 PR CESAREAN DELIVERY ONLY: ICD-10-PCS | Mod: 80,AT,, | Performed by: OBSTETRICS & GYNECOLOGY

## 2020-04-03 PROCEDURE — 36415 COLL VENOUS BLD VENIPUNCTURE: CPT

## 2020-04-03 PROCEDURE — 25000003 PHARM REV CODE 250: Performed by: NURSE ANESTHETIST, CERTIFIED REGISTERED

## 2020-04-03 PROCEDURE — S0028 INJECTION, FAMOTIDINE, 20 MG: HCPCS | Performed by: ANESTHESIOLOGY

## 2020-04-03 RX ORDER — PHENYLEPHRINE HYDROCHLORIDE 10 MG/ML
INJECTION INTRAVENOUS
Status: DISCONTINUED | OUTPATIENT
Start: 2020-04-03 | End: 2020-04-03

## 2020-04-03 RX ORDER — OXYCODONE HYDROCHLORIDE 5 MG/1
10 TABLET ORAL EVERY 4 HOURS PRN
Status: ACTIVE | OUTPATIENT
Start: 2020-04-03 | End: 2020-04-04

## 2020-04-03 RX ORDER — CEFAZOLIN SODIUM 2 G/50ML
2 SOLUTION INTRAVENOUS ONCE
Status: COMPLETED | OUTPATIENT
Start: 2020-04-03 | End: 2020-04-03

## 2020-04-03 RX ORDER — PRENATAL WITH FERROUS FUM AND FOLIC ACID 3080; 920; 120; 400; 22; 1.84; 3; 20; 10; 1; 12; 200; 27; 25; 2 [IU]/1; [IU]/1; MG/1; [IU]/1; MG/1; MG/1; MG/1; MG/1; MG/1; MG/1; UG/1; MG/1; MG/1; MG/1; MG/1
1 TABLET ORAL DAILY
Status: DISCONTINUED | OUTPATIENT
Start: 2020-04-03 | End: 2020-04-05 | Stop reason: HOSPADM

## 2020-04-03 RX ORDER — ONDANSETRON 8 MG/1
8 TABLET, ORALLY DISINTEGRATING ORAL EVERY 8 HOURS PRN
Status: DISCONTINUED | OUTPATIENT
Start: 2020-04-03 | End: 2020-04-03

## 2020-04-03 RX ORDER — KETOROLAC TROMETHAMINE 30 MG/ML
30 INJECTION, SOLUTION INTRAMUSCULAR; INTRAVENOUS EVERY 6 HOURS
Status: DISPENSED | OUTPATIENT
Start: 2020-04-03 | End: 2020-04-04

## 2020-04-03 RX ORDER — ACETAMINOPHEN 325 MG/1
650 TABLET ORAL EVERY 6 HOURS
Status: COMPLETED | OUTPATIENT
Start: 2020-04-03 | End: 2020-04-04

## 2020-04-03 RX ORDER — ACETAMINOPHEN 10 MG/ML
INJECTION, SOLUTION INTRAVENOUS
Status: DISCONTINUED | OUTPATIENT
Start: 2020-04-03 | End: 2020-04-03

## 2020-04-03 RX ORDER — GLYCOPYRROLATE 0.2 MG/ML
INJECTION INTRAMUSCULAR; INTRAVENOUS
Status: DISCONTINUED | OUTPATIENT
Start: 2020-04-03 | End: 2020-04-03

## 2020-04-03 RX ORDER — HYDROCODONE BITARTRATE AND ACETAMINOPHEN 10; 325 MG/1; MG/1
1 TABLET ORAL EVERY 4 HOURS PRN
Status: DISCONTINUED | OUTPATIENT
Start: 2020-04-04 | End: 2020-04-05 | Stop reason: HOSPADM

## 2020-04-03 RX ORDER — DIPHENHYDRAMINE HYDROCHLORIDE 50 MG/ML
INJECTION INTRAMUSCULAR; INTRAVENOUS
Status: DISCONTINUED | OUTPATIENT
Start: 2020-04-03 | End: 2020-04-03

## 2020-04-03 RX ORDER — FAMOTIDINE 10 MG/ML
20 INJECTION INTRAVENOUS
Status: DISCONTINUED | OUTPATIENT
Start: 2020-04-03 | End: 2020-04-03

## 2020-04-03 RX ORDER — OXYTOCIN/RINGER'S LACTATE 30/500 ML
95 PLASTIC BAG, INJECTION (ML) INTRAVENOUS ONCE
Status: COMPLETED | OUTPATIENT
Start: 2020-04-03 | End: 2020-04-03

## 2020-04-03 RX ORDER — SODIUM CITRATE AND CITRIC ACID MONOHYDRATE 334; 500 MG/5ML; MG/5ML
30 SOLUTION ORAL ONCE
Status: DISCONTINUED | OUTPATIENT
Start: 2020-04-03 | End: 2020-04-03

## 2020-04-03 RX ORDER — SODIUM CHLORIDE, SODIUM LACTATE, POTASSIUM CHLORIDE, CALCIUM CHLORIDE 600; 310; 30; 20 MG/100ML; MG/100ML; MG/100ML; MG/100ML
INJECTION, SOLUTION INTRAVENOUS CONTINUOUS
Status: ACTIVE | OUTPATIENT
Start: 2020-04-03 | End: 2020-04-03

## 2020-04-03 RX ORDER — OXYTOCIN/RINGER'S LACTATE 30/500 ML
95 PLASTIC BAG, INJECTION (ML) INTRAVENOUS ONCE
Status: DISCONTINUED | OUTPATIENT
Start: 2020-04-03 | End: 2020-04-03

## 2020-04-03 RX ORDER — BISACODYL 10 MG
10 SUPPOSITORY, RECTAL RECTAL ONCE AS NEEDED
Status: DISCONTINUED | OUTPATIENT
Start: 2020-04-03 | End: 2020-04-05 | Stop reason: HOSPADM

## 2020-04-03 RX ORDER — ONDANSETRON HYDROCHLORIDE 2 MG/ML
INJECTION, SOLUTION INTRAMUSCULAR; INTRAVENOUS
Status: DISCONTINUED | OUTPATIENT
Start: 2020-04-03 | End: 2020-04-03

## 2020-04-03 RX ORDER — METHYLERGONOVINE MALEATE 0.2 MG/ML
200 INJECTION INTRAVENOUS
Status: DISCONTINUED | OUTPATIENT
Start: 2020-04-03 | End: 2020-04-03

## 2020-04-03 RX ORDER — DIPHENHYDRAMINE HCL 25 MG
25 CAPSULE ORAL EVERY 4 HOURS PRN
Status: DISCONTINUED | OUTPATIENT
Start: 2020-04-03 | End: 2020-04-05 | Stop reason: HOSPADM

## 2020-04-03 RX ORDER — HYDROCODONE BITARTRATE AND ACETAMINOPHEN 5; 325 MG/1; MG/1
1 TABLET ORAL EVERY 4 HOURS PRN
Status: DISCONTINUED | OUTPATIENT
Start: 2020-04-04 | End: 2020-04-05 | Stop reason: HOSPADM

## 2020-04-03 RX ORDER — CEFAZOLIN SODIUM 2 G/50ML
2 SOLUTION INTRAVENOUS ONCE AS NEEDED
Status: DISCONTINUED | OUTPATIENT
Start: 2020-04-03 | End: 2020-04-03

## 2020-04-03 RX ORDER — SODIUM CITRATE AND CITRIC ACID MONOHYDRATE 334; 500 MG/5ML; MG/5ML
30 SOLUTION ORAL
Status: DISCONTINUED | OUTPATIENT
Start: 2020-04-03 | End: 2020-04-03

## 2020-04-03 RX ORDER — SIMETHICONE 80 MG
1 TABLET,CHEWABLE ORAL 4 TIMES DAILY PRN
Status: DISCONTINUED | OUTPATIENT
Start: 2020-04-03 | End: 2020-04-03

## 2020-04-03 RX ORDER — AMOXICILLIN 250 MG
1 CAPSULE ORAL NIGHTLY PRN
Status: DISCONTINUED | OUTPATIENT
Start: 2020-04-03 | End: 2020-04-05 | Stop reason: HOSPADM

## 2020-04-03 RX ORDER — DOCUSATE SODIUM 100 MG/1
200 CAPSULE, LIQUID FILLED ORAL 2 TIMES DAILY
Status: DISCONTINUED | OUTPATIENT
Start: 2020-04-03 | End: 2020-04-05 | Stop reason: HOSPADM

## 2020-04-03 RX ORDER — CALCIUM CARBONATE 200(500)MG
500 TABLET,CHEWABLE ORAL 3 TIMES DAILY PRN
Status: DISCONTINUED | OUTPATIENT
Start: 2020-04-03 | End: 2020-04-03

## 2020-04-03 RX ORDER — MIDAZOLAM HYDROCHLORIDE 1 MG/ML
INJECTION INTRAMUSCULAR; INTRAVENOUS
Status: DISCONTINUED | OUTPATIENT
Start: 2020-04-03 | End: 2020-04-03

## 2020-04-03 RX ORDER — ONDANSETRON 2 MG/ML
4 INJECTION INTRAMUSCULAR; INTRAVENOUS EVERY 6 HOURS PRN
Status: ACTIVE | OUTPATIENT
Start: 2020-04-03 | End: 2020-04-04

## 2020-04-03 RX ORDER — FENTANYL CITRATE 50 UG/ML
INJECTION, SOLUTION INTRAMUSCULAR; INTRAVENOUS
Status: DISCONTINUED | OUTPATIENT
Start: 2020-04-03 | End: 2020-04-03

## 2020-04-03 RX ORDER — NALBUPHINE HYDROCHLORIDE 10 MG/ML
10 INJECTION, SOLUTION INTRAMUSCULAR; INTRAVENOUS; SUBCUTANEOUS EVERY 4 HOURS PRN
Status: DISCONTINUED | OUTPATIENT
Start: 2020-04-03 | End: 2020-04-05 | Stop reason: HOSPADM

## 2020-04-03 RX ORDER — OXYTOCIN 10 [USP'U]/ML
INJECTION, SOLUTION INTRAMUSCULAR; INTRAVENOUS
Status: DISCONTINUED | OUTPATIENT
Start: 2020-04-03 | End: 2020-04-03

## 2020-04-03 RX ORDER — HYDROCORTISONE 25 MG/G
CREAM TOPICAL 3 TIMES DAILY PRN
Status: DISCONTINUED | OUTPATIENT
Start: 2020-04-03 | End: 2020-04-05 | Stop reason: HOSPADM

## 2020-04-03 RX ORDER — CARBOPROST TROMETHAMINE 250 UG/ML
250 INJECTION, SOLUTION INTRAMUSCULAR
Status: DISCONTINUED | OUTPATIENT
Start: 2020-04-03 | End: 2020-04-03

## 2020-04-03 RX ORDER — OXYTOCIN/RINGER'S LACTATE 30/500 ML
334 PLASTIC BAG, INJECTION (ML) INTRAVENOUS ONCE
Status: DISCONTINUED | OUTPATIENT
Start: 2020-04-03 | End: 2020-04-03

## 2020-04-03 RX ORDER — SODIUM CHLORIDE, SODIUM LACTATE, POTASSIUM CHLORIDE, CALCIUM CHLORIDE 600; 310; 30; 20 MG/100ML; MG/100ML; MG/100ML; MG/100ML
INJECTION, SOLUTION INTRAVENOUS CONTINUOUS PRN
Status: DISCONTINUED | OUTPATIENT
Start: 2020-04-03 | End: 2020-04-03

## 2020-04-03 RX ORDER — OXYCODONE HYDROCHLORIDE 5 MG/1
5 TABLET ORAL EVERY 4 HOURS PRN
Status: ACTIVE | OUTPATIENT
Start: 2020-04-03 | End: 2020-04-04

## 2020-04-03 RX ORDER — MISOPROSTOL 200 UG/1
800 TABLET ORAL
Status: DISCONTINUED | OUTPATIENT
Start: 2020-04-03 | End: 2020-04-03

## 2020-04-03 RX ORDER — BUPIVACAINE HYDROCHLORIDE 7.5 MG/ML
INJECTION, SOLUTION EPIDURAL; RETROBULBAR
Status: DISCONTINUED | OUTPATIENT
Start: 2020-04-03 | End: 2020-04-03

## 2020-04-03 RX ORDER — ONDANSETRON 8 MG/1
8 TABLET, ORALLY DISINTEGRATING ORAL EVERY 8 HOURS PRN
Status: DISCONTINUED | OUTPATIENT
Start: 2020-04-03 | End: 2020-04-05 | Stop reason: HOSPADM

## 2020-04-03 RX ORDER — ADHESIVE BANDAGE
30 BANDAGE TOPICAL 2 TIMES DAILY PRN
Status: DISCONTINUED | OUTPATIENT
Start: 2020-04-04 | End: 2020-04-05 | Stop reason: HOSPADM

## 2020-04-03 RX ORDER — IBUPROFEN 600 MG/1
600 TABLET ORAL EVERY 6 HOURS
Status: DISCONTINUED | OUTPATIENT
Start: 2020-04-03 | End: 2020-04-05 | Stop reason: HOSPADM

## 2020-04-03 RX ORDER — MORPHINE SULFATE 1 MG/ML
INJECTION, SOLUTION EPIDURAL; INTRATHECAL; INTRAVENOUS
Status: DISCONTINUED | OUTPATIENT
Start: 2020-04-03 | End: 2020-04-03

## 2020-04-03 RX ORDER — SIMETHICONE 80 MG
1 TABLET,CHEWABLE ORAL EVERY 6 HOURS PRN
Status: DISCONTINUED | OUTPATIENT
Start: 2020-04-03 | End: 2020-04-05 | Stop reason: HOSPADM

## 2020-04-03 RX ORDER — FAMOTIDINE 10 MG/ML
20 INJECTION INTRAVENOUS ONCE
Status: COMPLETED | OUTPATIENT
Start: 2020-04-03 | End: 2020-04-03

## 2020-04-03 RX ORDER — MUPIROCIN 20 MG/G
OINTMENT TOPICAL
Status: CANCELLED | OUTPATIENT
Start: 2020-04-03

## 2020-04-03 RX ORDER — DIPHENHYDRAMINE HYDROCHLORIDE 50 MG/ML
12.5 INJECTION INTRAMUSCULAR; INTRAVENOUS
Status: COMPLETED | OUTPATIENT
Start: 2020-04-03 | End: 2020-04-03

## 2020-04-03 RX ORDER — MUPIROCIN 20 MG/G
OINTMENT TOPICAL 2 TIMES DAILY
Status: CANCELLED | OUTPATIENT
Start: 2020-04-03 | End: 2020-04-08

## 2020-04-03 RX ADMIN — Medication 95 MILLI-UNITS/MIN: at 06:04

## 2020-04-03 RX ADMIN — SODIUM CHLORIDE, SODIUM LACTATE, POTASSIUM CHLORIDE, AND CALCIUM CHLORIDE: .6; .31; .03; .02 INJECTION, SOLUTION INTRAVENOUS at 02:04

## 2020-04-03 RX ADMIN — GLYCOPYRROLATE 0.2 MG: 0.2 INJECTION, SOLUTION INTRAMUSCULAR; INTRAVENOUS at 01:04

## 2020-04-03 RX ADMIN — FENTANYL CITRATE 50 MCG: 50 INJECTION, SOLUTION INTRAMUSCULAR; INTRAVENOUS at 02:04

## 2020-04-03 RX ADMIN — DOCUSATE SODIUM 200 MG: 100 CAPSULE, LIQUID FILLED ORAL at 09:04

## 2020-04-03 RX ADMIN — CEFAZOLIN SODIUM 2 G: 2 SOLUTION INTRAVENOUS at 12:04

## 2020-04-03 RX ADMIN — SODIUM CITRATE AND CITRIC ACID MONOHYDRATE 30 ML: 500; 334 SOLUTION ORAL at 12:04

## 2020-04-03 RX ADMIN — ACETAMINOPHEN 1000 MG: 10 INJECTION, SOLUTION INTRAVENOUS at 02:04

## 2020-04-03 RX ADMIN — PHENYLEPHRINE HYDROCHLORIDE 200 MCG: 10 INJECTION INTRAVENOUS at 02:04

## 2020-04-03 RX ADMIN — AZITHROMYCIN MONOHYDRATE 500 MG: 500 INJECTION, POWDER, LYOPHILIZED, FOR SOLUTION INTRAVENOUS at 01:04

## 2020-04-03 RX ADMIN — FENTANYL CITRATE 10 MCG: 50 INJECTION, SOLUTION INTRAMUSCULAR; INTRAVENOUS at 01:04

## 2020-04-03 RX ADMIN — IBUPROFEN 600 MG: 600 TABLET, FILM COATED ORAL at 11:04

## 2020-04-03 RX ADMIN — IBUPROFEN 600 MG: 600 TABLET, FILM COATED ORAL at 06:04

## 2020-04-03 RX ADMIN — SODIUM CHLORIDE, SODIUM LACTATE, POTASSIUM CHLORIDE, AND CALCIUM CHLORIDE 1000 ML: .6; .31; .03; .02 INJECTION, SOLUTION INTRAVENOUS at 12:04

## 2020-04-03 RX ADMIN — PHENYLEPHRINE HYDROCHLORIDE 200 MCG: 10 INJECTION INTRAVENOUS at 01:04

## 2020-04-03 RX ADMIN — SODIUM CHLORIDE, SODIUM LACTATE, POTASSIUM CHLORIDE, AND CALCIUM CHLORIDE 1000 ML: .6; .31; .03; .02 INJECTION, SOLUTION INTRAVENOUS at 01:04

## 2020-04-03 RX ADMIN — DIPHENHYDRAMINE HYDROCHLORIDE 12.5 MG: 50 INJECTION INTRAMUSCULAR; INTRAVENOUS at 04:04

## 2020-04-03 RX ADMIN — ACETAMINOPHEN 650 MG: 325 TABLET ORAL at 08:04

## 2020-04-03 RX ADMIN — ONDANSETRON 4 MG: 2 INJECTION, SOLUTION INTRAMUSCULAR; INTRAVENOUS at 01:04

## 2020-04-03 RX ADMIN — DOCUSATE SODIUM 200 MG: 100 CAPSULE, LIQUID FILLED ORAL at 08:04

## 2020-04-03 RX ADMIN — FENTANYL CITRATE 40 MCG: 50 INJECTION, SOLUTION INTRAMUSCULAR; INTRAVENOUS at 02:04

## 2020-04-03 RX ADMIN — PRENATAL VIT W/ FE FUMARATE-FA TAB 27-0.8 MG 1 TABLET: 27-0.8 TAB at 08:04

## 2020-04-03 RX ADMIN — OXYTOCIN 20 UNITS: 10 INJECTION, SOLUTION INTRAMUSCULAR; INTRAVENOUS at 02:04

## 2020-04-03 RX ADMIN — DIPHENHYDRAMINE HYDROCHLORIDE 25 MG: 25 CAPSULE ORAL at 11:04

## 2020-04-03 RX ADMIN — BUPIVACAINE HYDROCHLORIDE 1.6 ML: 7.5 INJECTION, SOLUTION EPIDURAL; RETROBULBAR at 01:04

## 2020-04-03 RX ADMIN — MIDAZOLAM HYDROCHLORIDE 2 MG: 1 INJECTION, SOLUTION INTRAMUSCULAR; INTRAVENOUS at 02:04

## 2020-04-03 RX ADMIN — Medication 0.1 MG: at 01:04

## 2020-04-03 RX ADMIN — ACETAMINOPHEN 650 MG: 325 TABLET ORAL at 06:04

## 2020-04-03 RX ADMIN — FAMOTIDINE 20 MG: 10 INJECTION INTRAVENOUS at 12:04

## 2020-04-03 RX ADMIN — DIPHENHYDRAMINE HYDROCHLORIDE 25 MG: 50 INJECTION INTRAMUSCULAR; INTRAVENOUS at 02:04

## 2020-04-03 NOTE — L&D DELIVERY NOTE
[facility] [place]   Operative Note    SUMMARY     Date of Procedure: 4/3/2020     Procedure: Procedure(s) (LRB):   SECTION (N/A)    Surgeon(s) and Role:     * Savannah Soriano MD - Primary     * Josefina-Derek Jacob Mai, MD - Assisting    Pre-Operative Diagnosis:   1. Premature Rupture of Membranes  2. Repeat Low Transverse  Section     Post-Operative Diagnosis: Post-Op Diagnosis Codes:     * Pregnant [Z34.90]    Anesthesia: Spinal/Epidural    Technical Procedures Used: Repeat Low Transverse  Section via Pfannenstiel Skin Incision           Description of the Findings of the Procedure: Repeat Low Transverse  Section via Pfannenstiel Incision    Complications: No    Blood Loss: * No values recorded between 4/3/2020 12:00 AM and 4/3/2020  3:00 AM *     With patient in supine position, the legs are  and Bello Catheter placed and positioning to supine done.   Abdomen prepped with Chloroprep and 3 minute drying time allowed prior to draping of the abdomen.   Time out taken with OR team members.  Pfannenstiel Incision made through the skin, transverse fascial incision developed, rectus muscles  in the midline and the peritoneum entered.   Omental adhesions noted and lysed with electrocautery.  The lower uterine segment and position of the fetus identified.    Low Transverse Incision made through well developer lower uterine segment and extended laterally with blunt dissection.   Clear fluid noted.  Infant delivered from vertex presentation.  Cord clamped after one minute and  handed to attending nurse.  Cord blood taken, placenta delivered.  The uterus was exteriorized.  The edges of the uterine incision are grasped with Norman clamps at the angles and the inferior and superior midline edges of the incision.    Closure with running lock 0 Monocryl, starting at the left angle, tying on the right.   A second imbricating layer was  place using a second 0 Monocryl.  Observation for bleeding with suture of any bleeding along the hysterotomy line.   With good hemostasis noted, the anterior pelvis is rinsed with sterile saline.   Right and left adnexa with normal anatomy.     Closure of the abdomen with 2 0 Vicryl running of the peritoneum.  The rectus abdominis muscles were reapproximated with 2-0 Vicryl in a mattress suture.   Fascial closure with 0 PDS starting at the each angle and tying the knot at the midline angle.  Skin closure with 4 0 Monocryl subcuticular.  Wound dressed with Aquacel.          Specimens:   Specimen (12h ago, onward)    None          Condition: Good    Disposition: PACU - hemodynamically stable.    Attestation: Good         Delivery Information for Girl Marcela King    Birth information:  YOB: 2019   Time of birth: 1:58 AM   Sex: female   Head Delivery Date/Time: 4/3/2020  1:58 AM   Delivery type: , Low Transverse   Gestational Age: 35w3d    Delivery Providers    Delivering clinician:  Savannah Soriano MD   Provider Role    [facility] Mai, MD First Assist    Eneida Zepeda RN Registered Nurse    Sandra Mercdao RN Registered Nurse    Silvino Mcdonald CRNA Nurse Anesthetist    Jackelin Millan NP Nurse Practitioner    Dee Parrish RN Scrub Nurse            Measurements    Weight:  2765 g  Length:  47 cm         Apgars    Living status:  Living  Apgars:   1 min.:   5 min.:   10 min.:   15 min.:   20 min.:     Skin color:   0  0       Heart rate:   2  2       Reflex irritability:   2  2       Muscle tone:   2  2       Respiratory effort:   2  2       Total:   8  8       Apgars assigned by:  NELLA ZULUAGA         Operative Delivery    Forceps attempted?:  No  Vacuum extractor attempted?:  No         Shoulder Dystocia    Shoulder dystocia present?:  No           Presentation    Presentation:  Vertex           Interventions/Resuscitation    Method:  PPV, Tactile  Stimulation, Deep Suctioning       Cord    Vessels:  3 vessels  Complications:  Nuchal  Nuchal Intervention:  reduced  Nuchal Cord Description:  loose nuchal cord  Number of Loops:  1  Delayed Cord Clamping?:  No  Cord Blood Disposition:  Lab  Gases Sent?:  No  Stem Cell Collection (by MD):  No       Placenta    Placenta delivery date/time:  4/3/2020 0158  Placenta removal:  Manual removal  Placenta appearance:  Intact  Placenta disposition:  lab           Labor Events:       labor: No     Labor Onset Date/Time:         Dilation Complete Date/Time:         Start Pushing Date/Time:         Start Pushing Date/Time:       Rupture Date/Time: 20         Rupture type:           Fluid Amount:        Fluid Color: Clear      Fluid Odor:        Membrane Status: SRM (Spontaneous Rupture)               steroids: None     Antibiotics given for GBS: No     Induction:       Indications for induction:        Augmentation:       Indications for augmentation:       Labor complications: None     Additional complications:          Cervical ripening:                     Delivery:      Episiotomy: None     Indication for Episiotomy:       Perineal Lacerations: None Repaired:      Periurethral Laceration:   Repaired:     Labial Laceration:   Repaired:     Sulcus Laceration:   Repaired:     Vaginal Laceration:   Repaired:     Cervical Laceration:   Repaired:     Repair suture:       Repair # of packets:       Last Value - EBL - Nursing (mL):       Sum - EBL - Nursing (mL): 0     Last Value - EBL - Anesthesia (mL):      Calculated QBL (mL): 125      Vaginal Sweep Performed:       Surgicount Correct:         Other providers:       Anesthesia    Method:  Spinal          Details (if applicable):  Trial of Labor No    Categorization: Repeat    Priority: Emergency   Indications for : Repeat Section   Incision Type: low transverse     Additional  information:  Forceps:    Vacuum:     Breech:    Observed anomalies    Other (Comments):

## 2020-04-03 NOTE — NURSING
Received to unit via transport wheelchair w/ complaints of srom since 2230 this evening. Clear fluid noted running down legs. No foul odor. sve closed. Denied pain. Abdomen soft w/o tenderness to palpation. poct 115. Last metformin @ 2100. Last ate @ 1830.     0005: Dr. Shaikh informed of pt status, rom, sve closed. No ctx. Orders for c/s.  Anesthesia informed

## 2020-04-03 NOTE — LACTATION NOTE
04/03/20 0755   Maternal Assessment   Breast Density Bilateral:;soft   Areola Bilateral:;elastic   Nipples Bilateral:;everted   Maternal Infant Feeding   Maternal Emotional State assist needed   Equipment Type   Breast Pump Type double electric, hospital grade   Breast Pump Flange Type hard   Breast Pump Flange Size 24 mm   Breast Pumping   Breast Pumping Interventions frequent pumping encouraged   Breast Pumping bilateral breasts pumped until soft;double electric breast pump utilized;pre-pumping breast massage;pre-pumping hand expression;pre-pumping tactile stimulation   mother with baby in NICU and willing to initiate pumping and hand expression -Mother was taught hand expression of breastmilk/colostrum. She was instructed to:   Sit upright and lean forward, if possible.   When feasible, apply warm, wet compress over breasts for a few minutes.    Perform gentle breast massage.   Form a C with her hand and place it about 1 inch back from the areola with the nipple centered between her index finger and her thumb.   Press, compress, relax:  Using her finger and thumb, apply pressure in an inward direction toward the breast without stretching the tissue, compress the breast tissue between her finger and thumb, then relax her finger and thumb. Repeat process for a few minutes.   Rotate placement of finger and thumb on the breasts to facilitate emptying.   Collect expressed breastmilk/colostrum with a spoon or cup and feed immediately to the baby, if able.   If unable to feed immediately, place breastmilk/colostrum directly into a sterile storage container for later use. Place the babys breast milk label (with the date and time of collection and the names of mother's medications) on the container. Reviewed proper handling and storage of expressed breastmilk.   Patient effectively return demonstrated and verbalized understanding.PlanHQhony pump, tubing, collections containers and labels brought to  bedside.  Discussed proper pump setting of initiation phase.  Instructed on proper usage of pump and to adjust suction according to maximum comfort level.  Verified appropriate flange fit.  Educated on the frequency and duration of pumping in order to promote and maintain a full milk supply.  Hands on pumping technique reviewed.  Encouraged hand expression after pumping.  Instructed on cleaning of breast pump parts.  Written instructions also given.  Pt verbalized understanding and appropriate recall for proper milk handling, collection, labeling, storage and transportation.  Drops collected and to NICU per FOB

## 2020-04-03 NOTE — ANESTHESIA PROCEDURE NOTES
Spinal    Diagnosis: IUP   Patient location during procedure: OR  Start time: 4/3/2020 1:30 AM  Timeout: 4/3/2020 1:30 AM  End time: 4/3/2020 1:34 AM    Staffing  Authorizing Provider: Laura Peña MD  Performing Provider: Laura Peña MD    Preanesthetic Checklist  Completed: patient identified, site marked, surgical consent, pre-op evaluation, timeout performed, IV checked, risks and benefits discussed and monitors and equipment checked  Spinal Block  Patient position: sitting  Prep: ChloraPrep  Patient monitoring: heart rate, continuous pulse ox and frequent blood pressure checks  Approach: midline  Location: L3-4  Injection technique: single shot  CSF Fluid: clear free-flowing CSF  Needle  Needle type: pencil-tip   Needle gauge: 25 G  Needle length: 3.5 in  Additional Documentation: incremental injection, no paresthesia on injection and negative aspiration for heme  Needle localization: anatomical landmarks  Assessment  Sensory level: T4   Dermatomal levels determined by pinch or prick  Ease of block: easy  Patient's tolerance of the procedure: comfortable throughout block

## 2020-04-03 NOTE — PLAN OF CARE
Patient progressing well. Pain well controlled with Duramorph and PO medication PRN. Bello draining clear yellow urine, will be removed shortly. Complaining of itching, relieved with benadryl. Pumping breasts. T-max 100, relieved with PO scheduled Tylenol. Encouraged hydration. NADN. Will continue to monitor.

## 2020-04-03 NOTE — H&P
[facility]  Obstetrics  History & Physical    Patient Name: Marcela King  MRN: [id number]  Admission Date: 2020  Primary Care Provider: Primary Doctor No    Subjective:     Principal Problem:<principal problem not specified>    History of Present Illness:   Marcela King is a 34 y.o.  female with IUP at 35w3d gestation who LOF per vagina since 11pm tonight. She reports that she noticed leakage of clear fluid. She denies contractions at this time.    This IUP is complicated by DM on metformin 500mg BID and history of C/S in [place] 10 years ago.  Patient denies contractions, denies vaginal bleeding, reports LOF.   Fetal Movement: normal.         Obstetric HPI:    OB History    Para Term  AB Living   2 1 1 0 0 1   SAB TAB Ectopic Multiple Live Births   0 0 0 0 1      # Outcome Date GA Lbr Hermann/2nd Weight Sex Delivery Anes PTL Lv   2 Current            1 Term 09   4.082 kg (9 lb) M CS-LTranv   VIV     No past medical history on file.  Past Surgical History:   Procedure Laterality Date     SECTION         PTA Medications   Medication Sig    folic acid (FOLVITE) 1 MG tablet Take 1 mg by mouth once daily.    metFORMIN (GLUCOPHAGE) 500 MG tablet Take 1 tablet (500 mg total) by mouth 2 (two) times daily with meals.    prenatal 25/iron fum/folic/dha (PRENATAL-1 ORAL) Take by mouth.       Review of patient's allergies indicates:  No Known Allergies     Family History     Problem Relation (Age of Onset)    Diabetes Mother        Tobacco Use    Smoking status: Never Smoker    Smokeless tobacco: Never Used   Substance and Sexual Activity    Alcohol use: Never     Frequency: Never    Drug use: Never    Sexual activity: Yes     Partners: Male     Birth control/protection: None     Review of Systems   Constitutional: Negative for chills and fever.   Respiratory: Negative for cough and wheezing.    Cardiovascular: Negative for chest pain and  palpitations.   Gastrointestinal: Negative for abdominal pain, nausea and vomiting.   Genitourinary: Negative for dysuria, frequency, hematuria, pelvic pain, vaginal bleeding, vaginal discharge and vaginal pain.      Objective:     Vital Signs (Most Recent):  Temp: 98.7 °F (37.1 °C) (20 001)  Pulse: 87 (20 0040)  Resp: 20 (20 0010)  BP: (!) 154/94 (20 0030)  SpO2: 98 % (20 0040) Vital Signs (24h Range):  Temp:  [98.7 °F (37.1 °C)] 98.7 °F (37.1 °C)  Pulse:  [82-95] 87  Resp:  [20] 20  SpO2:  [97 %-98 %] 98 %  BP: (132-154)/(80-94) 154/94     Weight: 88.9 kg (196 lb)  Body mass index is 34.72 kg/m².    FHT: Cat 1 (reassuring)  TOCO: Irregular    Physical Exam:   Constitutional: She is oriented to person, place, and time. She appears well-developed and well-nourished.       Cardiovascular: Normal rate.     Pulmonary/Chest: Effort normal. No respiratory distress.        Abdominal: Soft. She exhibits no distension and no abdominal incision.                 Neurological: She is alert and oriented to person, place, and time.    Skin: Skin is warm and dry.    Psychiatric: She has a normal mood and affect.       Cervix:  Dilation:  0  Effacement:  25%  Station: -3  Presentation: Vertex     Significant Labs:  Lab Results   Component Value Date    GROUPTRH O POS 2020       CBC:   Recent Labs   Lab 20  0015   WBC 10.20   RBC 4.19   HGB 12.7   HCT 38.2      MCV 91   MCH 30.3   MCHC 33.2     CMP: No results for input(s): GLU, CALCIUM, ALBUMIN, PROT, NA, K, CO2, CL, BUN, CREATININE, ALKPHOS, ALT, AST, BILITOT in the last 48 hours.  I have personallly reviewed all pertinent lab results from the last 24 hours.    Assessment/Plan:     34 y.o. female  at 35w3d for:    Antepartum premature rupture of membrane  - Consents signed and to chart  - Admit to Labor and Delivery unit  - Epidural per Anesthesia  - Draw CBC, T&S stat  - Ancef OCTOR  - To OR for C/S. Case Request is in.    - Ultrasound performed, infant in cephalic position.   - Post-Partum Hemorrhage risk - medium              Gestational diabetes mellitus (GDM) affecting second pregnancy  - Metformin 500mg BID        aSvannah Soriano MD  Obstetrics  [facility]

## 2020-04-03 NOTE — PROGRESS NOTES
Lab called about fasting glucose lab that was supposed to be drawn at 0600 while patient was still on L & D. Spoke with Dr. Laecy when she rounded and since patient was diet controlled during pregnancy, she stated it was fine that order was not completed and to cancel the order.

## 2020-04-03 NOTE — HPI
Marcela King is a 34 y.o.  female with IUP at 35w3d gestation who LOF per vagina since 11pm tonight. She reports that she noticed leakage of clear fluid. She denies contractions at this time.    This IUP is complicated by DM on metformin 500mg BID and history of C/S in [place] 10 years ago.  Patient denies contractions, denies vaginal bleeding, reports LOF.   Fetal Movement: normal.

## 2020-04-03 NOTE — SUBJECTIVE & OBJECTIVE
Obstetric HPI:    OB History    Para Term  AB Living   2 1 1 0 0 1   SAB TAB Ectopic Multiple Live Births   0 0 0 0 1      # Outcome Date GA Lbr Hermann/2nd Weight Sex Delivery Anes PTL Lv   2 Current            1 Term 09   4.082 kg (9 lb) M CS-LTranv   VIV     No past medical history on file.  Past Surgical History:   Procedure Laterality Date     SECTION         PTA Medications   Medication Sig    folic acid (FOLVITE) 1 MG tablet Take 1 mg by mouth once daily.    metFORMIN (GLUCOPHAGE) 500 MG tablet Take 1 tablet (500 mg total) by mouth 2 (two) times daily with meals.    prenatal 25/iron fum/folic/dha (PRENATAL-1 ORAL) Take by mouth.       Review of patient's allergies indicates:  No Known Allergies     Family History     Problem Relation (Age of Onset)    Diabetes Mother        Tobacco Use    Smoking status: Never Smoker    Smokeless tobacco: Never Used   Substance and Sexual Activity    Alcohol use: Never     Frequency: Never    Drug use: Never    Sexual activity: Yes     Partners: Male     Birth control/protection: None     Review of Systems   Constitutional: Negative for chills and fever.   Respiratory: Negative for cough and wheezing.    Cardiovascular: Negative for chest pain and palpitations.   Gastrointestinal: Negative for abdominal pain, nausea and vomiting.   Genitourinary: Negative for dysuria, frequency, hematuria, pelvic pain, vaginal bleeding, vaginal discharge and vaginal pain.      Objective:     Vital Signs (Most Recent):  Temp: 98.7 °F (37.1 °C) (20 0010)  Pulse: 87 (20 0040)  Resp: 20 (20 0010)  BP: (!) 154/94 (20 0030)  SpO2: 98 % (20 0040) Vital Signs (24h Range):  Temp:  [98.7 °F (37.1 °C)] 98.7 °F (37.1 °C)  Pulse:  [82-95] 87  Resp:  [20] 20  SpO2:  [97 %-98 %] 98 %  BP: (132-154)/(80-94) 154/94     Weight: 88.9 kg (196 lb)  Body mass index is 34.72 kg/m².    FHT: Cat 1 (reassuring)  TOCO: Irregular    Physical Exam:    Constitutional: She is oriented to person, place, and time. She appears well-developed and well-nourished.       Cardiovascular: Normal rate.     Pulmonary/Chest: Effort normal. No respiratory distress.        Abdominal: Soft. She exhibits no distension and no abdominal incision.                 Neurological: She is alert and oriented to person, place, and time.    Skin: Skin is warm and dry.    Psychiatric: She has a normal mood and affect.       Cervix:  Dilation:  0  Effacement:  25%  Station: -3  Presentation: Vertex     Significant Labs:  Lab Results   Component Value Date    GROUPTR O POS 02/12/2020       CBC:   Recent Labs   Lab 04/03/20  0015   WBC 10.20   RBC 4.19   HGB 12.7   HCT 38.2      MCV 91   MCH 30.3   MCHC 33.2     CMP: No results for input(s): GLU, CALCIUM, ALBUMIN, PROT, NA, K, CO2, CL, BUN, CREATININE, ALKPHOS, ALT, AST, BILITOT in the last 48 hours.  I have personallly reviewed all pertinent lab results from the last 24 hours.

## 2020-04-03 NOTE — ANESTHESIA PREPROCEDURE EVALUATION
2020    Pre-operative evaluation for Procedure(s) (LRB):   SECTION (N/A)    Marcela King is a 34 y.o. female     Patient Active Problem List   Diagnosis    History of  section complicating pregnancy    Gestational diabetes mellitus (GDM) affecting second pregnancy    Antepartum premature rupture of membrane       Review of patient's allergies indicates:  No Known Allergies    No current facility-administered medications on file prior to encounter.      Current Outpatient Medications on File Prior to Encounter   Medication Sig Dispense Refill    folic acid (FOLVITE) 1 MG tablet Take 1 mg by mouth once daily.      metFORMIN (GLUCOPHAGE) 500 MG tablet Take 1 tablet (500 mg total) by mouth 2 (two) times daily with meals. 60 tablet 11    prenatal 25/iron fum/folic/dha (PRENATAL-1 ORAL) Take by mouth.         Past Surgical History:   Procedure Laterality Date     SECTION       VITALS  Vitals:    20 0040   BP:    Pulse: 87   Resp:    Temp:        LABS    Lab Results   Component Value Date    WBC 10.20 2020    HGB 12.7 2020    HCT 38.2 2020    MCV 91 2020     2020       Anesthesia Evaluation    I have reviewed the Patient Summary Reports.     I have reviewed the Medications.     Review of Systems  Anesthesia Hx:  No problems with previous Anesthesia  History of prior surgery of interest to airway management or planning: Denies Family Hx of Anesthesia complications.   Denies Personal Hx of Anesthesia complications.   Social:  Non-Smoker    Hematology/Oncology:  Hematology Normal   Oncology Normal     EENT/Dental:EENT/Dental Normal   Cardiovascular:   Exercise tolerance: good    Pulmonary:  Pulmonary Normal    Renal/:  Renal/ Normal     Hepatic/GI:  Hepatic/GI Normal    Neurological:  Neurology Normal    Endocrine:   Diabetes        Physical Exam  General:  Well nourished    Airway/Jaw/Neck:  Airway Findings: Mouth Opening: Normal  Tongue: Normal  General Airway Assessment: Adult  Mallampati: II  Jaw/Neck Findings:  Neck ROM: Normal ROM      Dental:  Dental Findings: Periodontal disease, Mild   Chest/Lungs:  Chest/Lungs Findings: Normal Respiratory Rate     Heart/Vascular:  Heart Findings: Rate: Normal        Mental Status:  Mental Status Findings:  Cooperative, Alert and Oriented         Anesthesia Plan  Type of Anesthesia, risks & benefits discussed:  Anesthesia Type:  CSE, spinal, epidural, general  Patient's Preference:   Intra-op Monitoring Plan: standard ASA monitors  Intra-op Monitoring Plan Comments:   Post Op Pain Control Plan:   Post Op Pain Control Plan Comments:   Induction:   IV  Beta Blocker:  Patient is not currently on a Beta-Blocker (No further documentation required).       Informed Consent: Patient understands risks and agrees with Anesthesia plan.  Questions answered. Anesthesia consent signed with patient.  ASA Score: 2     Day of Surgery Review of History & Physical: I have interviewed and examined the patient. I have reviewed the patient's H&P dated:  There are no significant changes.          Ready For Surgery From Anesthesia Perspective.

## 2020-04-03 NOTE — ASSESSMENT & PLAN NOTE
- Consents signed and to chart  - Admit to Labor and Delivery unit  - Epidural per Anesthesia  - Draw CBC, T&S stat  - Ancef OCTOR  - To OR for C/S. Case Request is in.   - Ultrasound performed, infant in cephalic position.   - Post-Partum Hemorrhage risk - medium

## 2020-04-04 PROBLEM — O13.9 GESTATIONAL HYPERTENSION: Status: ACTIVE | Noted: 2020-04-04

## 2020-04-04 LAB
ALBUMIN SERPL BCP-MCNC: 2.4 G/DL (ref 3.5–5.2)
ALP SERPL-CCNC: 136 U/L (ref 55–135)
ALT SERPL W/O P-5'-P-CCNC: 29 U/L (ref 10–44)
ANION GAP SERPL CALC-SCNC: 8 MMOL/L (ref 8–16)
AST SERPL-CCNC: 48 U/L (ref 10–40)
BASOPHILS # BLD AUTO: 0.05 K/UL (ref 0–0.2)
BASOPHILS NFR BLD: 0.4 % (ref 0–1.9)
BILIRUB SERPL-MCNC: 0.2 MG/DL (ref 0.1–1)
BUN SERPL-MCNC: 9 MG/DL (ref 6–20)
CALCIUM SERPL-MCNC: 8.6 MG/DL (ref 8.7–10.5)
CHLORIDE SERPL-SCNC: 110 MMOL/L (ref 95–110)
CO2 SERPL-SCNC: 21 MMOL/L (ref 23–29)
CREAT SERPL-MCNC: 0.6 MG/DL (ref 0.5–1.4)
CREAT UR-MCNC: 23.4 MG/DL (ref 15–325)
DIFFERENTIAL METHOD: ABNORMAL
EOSINOPHIL # BLD AUTO: 0.1 K/UL (ref 0–0.5)
EOSINOPHIL NFR BLD: 0.6 % (ref 0–8)
ERYTHROCYTE [DISTWIDTH] IN BLOOD BY AUTOMATED COUNT: 14.7 % (ref 11.5–14.5)
EST. GFR  (AFRICAN AMERICAN): >60 ML/MIN/1.73 M^2
EST. GFR  (NON AFRICAN AMERICAN): >60 ML/MIN/1.73 M^2
GLUCOSE SERPL-MCNC: 63 MG/DL (ref 70–110)
HCT VFR BLD AUTO: 35.4 % (ref 37–48.5)
HGB BLD-MCNC: 11.4 G/DL (ref 12–16)
IMM GRANULOCYTES # BLD AUTO: 0.17 K/UL (ref 0–0.04)
IMM GRANULOCYTES NFR BLD AUTO: 1.4 % (ref 0–0.5)
LYMPHOCYTES # BLD AUTO: 2.7 K/UL (ref 1–4.8)
LYMPHOCYTES NFR BLD: 21.1 % (ref 18–48)
MCH RBC QN AUTO: 30.3 PG (ref 27–31)
MCHC RBC AUTO-ENTMCNC: 32.2 G/DL (ref 32–36)
MCV RBC AUTO: 94 FL (ref 82–98)
MONOCYTES # BLD AUTO: 1 K/UL (ref 0.3–1)
MONOCYTES NFR BLD: 8.2 % (ref 4–15)
NEUTROPHILS # BLD AUTO: 8.6 K/UL (ref 1.8–7.7)
NEUTROPHILS NFR BLD: 68.3 % (ref 38–73)
NRBC BLD-RTO: 0 /100 WBC
PLATELET # BLD AUTO: 205 K/UL (ref 150–350)
PMV BLD AUTO: 10.1 FL (ref 9.2–12.9)
POTASSIUM SERPL-SCNC: 3.8 MMOL/L (ref 3.5–5.1)
PROT SERPL-MCNC: 6.3 G/DL (ref 6–8.4)
PROT UR-MCNC: <7 MG/DL
PROT/CREAT UR: NORMAL MG/G{CREAT} (ref 0–0.2)
RBC # BLD AUTO: 3.76 M/UL (ref 4–5.4)
SODIUM SERPL-SCNC: 139 MMOL/L (ref 136–145)
WBC # BLD AUTO: 12.59 K/UL (ref 3.9–12.7)

## 2020-04-04 PROCEDURE — 82570 ASSAY OF URINE CREATININE: CPT

## 2020-04-04 PROCEDURE — 25000003 PHARM REV CODE 250: Performed by: ANESTHESIOLOGY

## 2020-04-04 PROCEDURE — 25000003 PHARM REV CODE 250: Performed by: OBSTETRICS & GYNECOLOGY

## 2020-04-04 PROCEDURE — 85025 COMPLETE CBC W/AUTO DIFF WBC: CPT

## 2020-04-04 PROCEDURE — 99231 PR SUBSEQUENT HOSPITAL CARE,LEVL I: ICD-10-PCS | Mod: ,,, | Performed by: OBSTETRICS & GYNECOLOGY

## 2020-04-04 PROCEDURE — 80053 COMPREHEN METABOLIC PANEL: CPT

## 2020-04-04 PROCEDURE — 99231 SBSQ HOSP IP/OBS SF/LOW 25: CPT | Mod: ,,, | Performed by: OBSTETRICS & GYNECOLOGY

## 2020-04-04 PROCEDURE — 11000001 HC ACUTE MED/SURG PRIVATE ROOM

## 2020-04-04 RX ADMIN — PRENATAL VIT W/ FE FUMARATE-FA TAB 27-0.8 MG 1 TABLET: 27-0.8 TAB at 12:04

## 2020-04-04 RX ADMIN — DOCUSATE SODIUM 200 MG: 100 CAPSULE, LIQUID FILLED ORAL at 10:04

## 2020-04-04 RX ADMIN — ACETAMINOPHEN 650 MG: 325 TABLET ORAL at 05:04

## 2020-04-04 RX ADMIN — IBUPROFEN 600 MG: 600 TABLET, FILM COATED ORAL at 05:04

## 2020-04-04 RX ADMIN — ACETAMINOPHEN 650 MG: 325 TABLET ORAL at 12:04

## 2020-04-04 RX ADMIN — HYDROCODONE BITARTRATE AND ACETAMINOPHEN 1 TABLET: 5; 325 TABLET ORAL at 06:04

## 2020-04-04 RX ADMIN — IBUPROFEN 600 MG: 600 TABLET, FILM COATED ORAL at 12:04

## 2020-04-04 RX ADMIN — HYDROCODONE BITARTRATE AND ACETAMINOPHEN 1 TABLET: 5; 325 TABLET ORAL at 12:04

## 2020-04-04 RX ADMIN — IBUPROFEN 600 MG: 600 TABLET, FILM COATED ORAL at 11:04

## 2020-04-04 RX ADMIN — DOCUSATE SODIUM 200 MG: 100 CAPSULE, LIQUID FILLED ORAL at 12:04

## 2020-04-04 RX ADMIN — IBUPROFEN 600 MG: 600 TABLET, FILM COATED ORAL at 06:04

## 2020-04-04 NOTE — LACTATION NOTE
04/04/20 1100   Pain/Comfort/Sleep   Pain Body Location - Side Bilateral   Pain Body Location breast   Pain Rating (0-10): Activity 0   Breasts WDL   Breast WDL WDL   Breast Pumping   Breast Pumping Interventions frequent pumping encouraged   Maternal Feeding Assessment   Maternal Emotional State relaxed;independent   Reproductive Interventions   Breastfeeding Assistance electric breast pump used   Breastfeeding Support encouragement provided;lactation counseling provided     FOB at bedside as .  Pumping well 8 - 12 times in 24 hours with Symphony pump.  Appropriate labeling and storage of EBM noted.  Reviewed NICU pumping instructions.  Encouraged to call for assist prn.  Pump parts sanitized with Medela microsteam bag.

## 2020-04-04 NOTE — PROGRESS NOTES
Denies known Latex allergy or symptoms of Latex sensitivity.  Medications verified, no changes.  Allergies verified, no changes.   Tobacco use verified, no changes.   PCP verified, no changes.   Body mass index is 24.44 kg/m².  First visit.  Non diabetic pt here for a toenail trim and calluses.  Blood pressure deferred.     [facility]  Obstetrics  Postpartum Progress Note    Patient Name: Marcela King  MRN: [id number]  Admission Date: 2020  Hospital Length of Stay: 2 days  Attending Physician: Ronnie Jordan MD  Primary Care Provider: Primary Doctor No    Subjective:     Principal Problem:S/P repeat low transverse     Hospital course: 20: PPD# 1. Doing well, pain is controlled, she is pumping.  Denies preE symptoms    Interval History:     She is doing well this morning. She is tolerating a regular diet without nausea or vomiting. She is voiding spontaneously. She is ambulating. She has passed flatus, and has not a BM. Vaginal bleeding is mild. She denies fever or chills. Abdominal pain is mild and controlled with oral medications. She is breastfeeding. Baby in NICU    Objective:     Vital Signs (Most Recent):  Temp: 97.4 °F (36.3 °C) (20)  Pulse: 84 (20)  Resp: 18 (20)  BP: (!) 142/69 (20)  SpO2: 97 % (20) Vital Signs (24h Range):  Temp:  [97.4 °F (36.3 °C)-98.3 °F (36.8 °C)] 97.4 °F (36.3 °C)  Pulse:  [84-93] 84  Resp:  [17-20] 18  SpO2:  [96 %-97 %] 97 %  BP: (109-143)/(64-76) 142/69     Weight: 88.9 kg (196 lb)  Body mass index is 34.72 kg/m².      Intake/Output Summary (Last 24 hours) at 2020 1102  Last data filed at 2020 0500  Gross per 24 hour   Intake 1460 ml   Output 3400 ml   Net -1940 ml       Significant Labs:  Lab Results   Component Value Date    GROUPTRH O POS 2020     Recent Labs   Lab 20  0440   HGB 11.4*   HCT 35.4*       I have personallly reviewed all pertinent lab results from the last 24 hours.    Physical Exam:   Constitutional: She is oriented to person, place, and time. She appears well-developed and well-nourished.    HENT:   Head: Normocephalic and atraumatic.    Eyes: Pupils are equal, round, and reactive to light. EOM are normal.    Neck: Normal range of motion.     Pulmonary/Chest: Effort  normal.        Abdominal: Soft. She exhibits no distension and no mass. There is no tenderness. There is no rebound and no guarding. No hernia.   Fundus firm and below umbilicus             Musculoskeletal: Normal range of motion and moves all extremeties.       Neurological: She is alert and oriented to person, place, and time.    Skin: Skin is warm.    Psychiatric: She has a normal mood and affect. Her behavior is normal. Thought content normal.       Assessment/Plan:     34 y.o. female  for:    * S/P repeat low transverse   Routine pp are  Encourage ambulation.  hgb 12.7 --> 11.4              Gestational hypertension  Two mild range BP during pp  Pt denies preE symptoms  Will get preE labs to r/o preE    Gestational diabetes mellitus (GDM) affecting second pregnancy  Will need 2hr GTT at 6 weeks pp        Disposition: As patient meets milestones, will plan to discharge tomorrow.    Yaa Lacey MD  Obstetrics  [facility]

## 2020-04-04 NOTE — PLAN OF CARE
VSS. NAD. Ambulating and voiding. Denies pain/discomfort. Infant in stepdown NICU. S.O. At bedside translating per pt request. Discussed POC, pain management, and ambulation. Pt and s.o. Verbalize understanding.

## 2020-04-04 NOTE — SUBJECTIVE & OBJECTIVE
Hospital course: 4/4/20: PPD# 1. Doing well, pain is controlled, she is pumping.  Denies preE symptoms    Interval History:     She is doing well this morning. She is tolerating a regular diet without nausea or vomiting. She is voiding spontaneously. She is ambulating. She has passed flatus, and has not a BM. Vaginal bleeding is mild. She denies fever or chills. Abdominal pain is mild and controlled with oral medications. She is breastfeeding. Baby in NICU    Objective:     Vital Signs (Most Recent):  Temp: 97.4 °F (36.3 °C) (04/04/20 0747)  Pulse: 84 (04/04/20 0747)  Resp: 18 (04/04/20 0747)  BP: (!) 142/69 (04/04/20 0747)  SpO2: 97 % (04/04/20 0747) Vital Signs (24h Range):  Temp:  [97.4 °F (36.3 °C)-98.3 °F (36.8 °C)] 97.4 °F (36.3 °C)  Pulse:  [84-93] 84  Resp:  [17-20] 18  SpO2:  [96 %-97 %] 97 %  BP: (109-143)/(64-76) 142/69     Weight: 88.9 kg (196 lb)  Body mass index is 34.72 kg/m².      Intake/Output Summary (Last 24 hours) at 4/4/2020 1102  Last data filed at 4/4/2020 0500  Gross per 24 hour   Intake 1460 ml   Output 3400 ml   Net -1940 ml       Significant Labs:  Lab Results   Component Value Date    GROUPTRH O POS 04/03/2020     Recent Labs   Lab 04/04/20  0440   HGB 11.4*   HCT 35.4*       I have personallly reviewed all pertinent lab results from the last 24 hours.    Physical Exam:   Constitutional: She is oriented to person, place, and time. She appears well-developed and well-nourished.    HENT:   Head: Normocephalic and atraumatic.    Eyes: Pupils are equal, round, and reactive to light. EOM are normal.    Neck: Normal range of motion.     Pulmonary/Chest: Effort normal.        Abdominal: Soft. She exhibits no distension and no mass. There is no tenderness. There is no rebound and no guarding. No hernia.   Fundus firm and below umbilicus             Musculoskeletal: Normal range of motion and moves all extremeties.       Neurological: She is alert and oriented to person, place, and time.    Skin:  Skin is warm.    Psychiatric: She has a normal mood and affect. Her behavior is normal. Thought content normal.

## 2020-04-05 VITALS
RESPIRATION RATE: 20 BRPM | SYSTOLIC BLOOD PRESSURE: 146 MMHG | HEART RATE: 84 BPM | DIASTOLIC BLOOD PRESSURE: 80 MMHG | OXYGEN SATURATION: 97 % | TEMPERATURE: 99 F | WEIGHT: 196 LBS | HEIGHT: 63 IN | BODY MASS INDEX: 34.73 KG/M2

## 2020-04-05 PROCEDURE — 99238 PR HOSPITAL DISCHARGE DAY,<30 MIN: ICD-10-PCS | Mod: ,,, | Performed by: OBSTETRICS & GYNECOLOGY

## 2020-04-05 PROCEDURE — 99238 HOSP IP/OBS DSCHRG MGMT 30/<: CPT | Mod: ,,, | Performed by: OBSTETRICS & GYNECOLOGY

## 2020-04-05 PROCEDURE — 25000003 PHARM REV CODE 250: Performed by: OBSTETRICS & GYNECOLOGY

## 2020-04-05 RX ORDER — IBUPROFEN 600 MG/1
600 TABLET ORAL EVERY 6 HOURS PRN
Qty: 30 TABLET | Refills: 0 | Status: SHIPPED | OUTPATIENT
Start: 2020-04-05

## 2020-04-05 RX ORDER — HYDROCODONE BITARTRATE AND ACETAMINOPHEN 5; 325 MG/1; MG/1
1 TABLET ORAL EVERY 6 HOURS PRN
Qty: 30 TABLET | Refills: 0 | Status: SHIPPED | OUTPATIENT
Start: 2020-04-05

## 2020-04-05 RX ORDER — DOCUSATE SODIUM 100 MG/1
100 CAPSULE, LIQUID FILLED ORAL 2 TIMES DAILY
Qty: 60 CAPSULE | Refills: 0 | Status: SHIPPED | OUTPATIENT
Start: 2020-04-05

## 2020-04-05 RX ADMIN — DOCUSATE SODIUM 200 MG: 100 CAPSULE, LIQUID FILLED ORAL at 08:04

## 2020-04-05 RX ADMIN — PRENATAL VIT W/ FE FUMARATE-FA TAB 27-0.8 MG 1 TABLET: 27-0.8 TAB at 08:04

## 2020-04-05 RX ADMIN — IBUPROFEN 600 MG: 600 TABLET, FILM COATED ORAL at 05:04

## 2020-04-05 RX ADMIN — IBUPROFEN 600 MG: 600 TABLET, FILM COATED ORAL at 11:04

## 2020-04-05 NOTE — PROGRESS NOTES
Pt. Discharged via ambulation to car per pt. Request. S.o. At pt. Side. No c/o pain or needs at this time.

## 2020-04-05 NOTE — DISCHARGE SUMMARY
[facility]  Obstetrics  Discharge Summary      Patient Name: Marcela King  MRN: [id number]  Admission Date: 2020  Hospital Length of Stay: 3 days  Discharge Date and Time:  2020 12:14 PM  Attending Physician: Ronnie Jordan MD   Discharging Provider: Yaa Lacey MD   Primary Care Provider: Primary Doctor No    HPI:  Marcela King is a 34 y.o.  female with IUP at 35w3d gestation who LOF per vagina since 11pm tonight. She reports that she noticed leakage of clear fluid. She denies contractions at this time.    This IUP is complicated by DM on metformin 500mg BID and history of C/S in [place] 10 years ago.  Patient denies contractions, denies vaginal bleeding, reports LOF.   Fetal Movement: normal.         Procedure(s) (LRB):   SECTION (N/A)     Hospital Course:   20: PPD# 1. Doing well, pain is controlled, she is pumping.  Denies preE symptoms   Pt was found to have mild range BP pp, preE labs were normal. She was diagnosed with GHTN,  Pt denies preE symptoms.  Pt was discharged to home on PPD # 2.  Her vital signs remained stable, mainly normotensive with a few mild range.      Consults (From admission, onward)        Status Ordering Provider     Consult to Lactation  Use PRN     Provider:  (Not yet assigned)    Acknowledged MITRA CHAHAL          Final Active Diagnoses:    Diagnosis Date Noted POA    PRINCIPAL PROBLEM:  S/P repeat low transverse  [Z98.891] 2020 Not Applicable    Gestational hypertension [O13.9] 2020 No    Gestational diabetes mellitus (GDM) affecting second pregnancy [O24.419] 2020 Yes      Problems Resolved During this Admission:        Labs:   CBC   Recent Labs   Lab 20  0440   WBC 12.59   HGB 11.4*   HCT 35.4*          Feeding Method: breast    Immunizations     None          Delivery:    Episiotomy: None   Lacerations: None   Repair suture:     Repair # of packets:     Blood  loss (ml):       Birth information:  YOB: 2019   Time of birth: 1:58 AM   Sex: female   Delivery type: , Low Transverse   Gestational Age: 35w3d    Delivery Clinician:      Other providers:       Additional  information:  Forceps:    Vacuum:    Breech:    Observed anomalies      Living?:           APGARS  One minute Five minutes Ten minutes   Skin color:         Heart rate:         Grimace:         Muscle tone:         Breathing:         Totals: 8  8        Placenta: Delivered:       appearance    Pending Diagnostic Studies:     Procedure Component Value Units Date/Time    Specimen to Pathology, Surgery Gynecology and Obstetrics [[id number]] Collected:  20 0158    Order Status:  Sent Lab Status:  In process Updated:  20          Discharged Condition: good    Disposition: Home or Self Care    Follow Up:  Follow-up Information     Ronnie Jordan MD In 1 week.    Specialties:  Obstetrics, Obstetrics and Gynecology  Why:  For wound re-check, bp check  Contact information:  [address]  [address]  [facility] [address]  [phone]                 Patient Instructions:      Pelvic Rest     Notify your health care provider if you experience any of the following:  increased confusion or weakness     Notify your health care provider if you experience any of the following:  persistent dizziness, light-headedness, or visual disturbances     Notify your health care provider if you experience any of the following:  worsening rash     Notify your health care provider if you experience any of the following:  severe persistent headache     Notify your health care provider if you experience any of the following:  difficulty breathing or increased cough     Notify your health care provider if you experience any of the following:  redness, tenderness, or signs of infection (pain, swelling, redness, odor or green/yellow discharge around incision site)     Notify your health care provider if you  experience any of the following:  severe uncontrolled pain     Notify your health care provider if you experience any of the following:  persistent nausea and vomiting or diarrhea     Notify your health care provider if you experience any of the following:  temperature >100.4     Leave dressing on - Keep it clean, dry, and intact until clinic visit     Activity as tolerated     Medications:  Current Discharge Medication List      START taking these medications    Details   docusate sodium (COLACE) 100 MG capsule Take 1 capsule (100 mg total) by mouth 2 (two) times daily.  Qty: 60 capsule, Refills: 0      HYDROcodone-acetaminophen (NORCO) 5-325 mg per tablet Take 1 tablet by mouth every 6 (six) hours as needed.  Qty: 30 tablet, Refills: 0    Comments: Quantity prescribed more than 7 day supply? Yes, quantity medically necessary      ibuprofen (ADVIL,MOTRIN) 600 MG tablet Take 1 tablet (600 mg total) by mouth every 6 (six) hours as needed for Pain.  Qty: 30 tablet, Refills: 0         CONTINUE these medications which have NOT CHANGED    Details   prenatal 25/iron fum/folic/dha (PRENATAL-1 ORAL) Take by mouth.         STOP taking these medications       folic acid (FOLVITE) 1 MG tablet Comments:   Reason for Stopping:         metFORMIN (GLUCOPHAGE) 500 MG tablet Comments:   Reason for Stopping:               Yaa Lacey MD  Obstetrics  [facility]

## 2020-04-05 NOTE — PLAN OF CARE
VSS, Pumping breast for baby in NICU, encouraged 8 times in 24 hours. Wearing supportive bra. Fundus and lochia WDL. LTVA Dressing, Aquacel CDI. . BS+ 4 quads, states flatus, ambulating , voiding and tolerating regular diet. Bonding well with baby in NICU .Pain being managed with motrin and percocet as needed. Stated an understanding to POC with  translating in [place] as needed.

## 2020-04-05 NOTE — LACTATION NOTE
"   04/05/20 1200   Pain/Comfort/Sleep   Pain Body Location - Side Bilateral   Pain Body Location breast   Pain Rating (0-10): Activity 0   Breasts WDL   Breast WDL WDL   Breast Pumping   Breast Pumping Interventions frequent pumping encouraged   Maternal Feeding Assessment   Maternal Emotional State relaxed;independent   Reproductive Interventions   Breastfeeding Support encouragement provided;lactation counseling provided     FOB as .  Has manual pump at home.  Encouraged to take OLIVER pump and discussed benefit of hospital grade pump.  Declined at this time.  Has information to obtain electric breast pump through Medicaid.  Transport bag, collection containers, labels, cooling pack, Microsteam bag and written instructions given.  Verbal instructions also given on use of pump, skin/skin, hand expression, storage/handling/transport of milk, and cleaning/sterilization of pump parts.  Denies c/o or concerns at this time.  Hotline # given for in home use.  States "understand" and verbalized appropriate recall.    "

## 2020-04-05 NOTE — DISCHARGE INSTRUCTIONS
After a Cesearean Birth    General Discharge Instructions  · May follow a regular diet, unless otherwise discussed with physician.  · Take showers, not baths unless otherwise discussed with physician.  · Activity as tolerated.  · No lifting or heavy exercise for 6 weeks, no driving for 2 weeks, no sexual intercourse, douching or tampons for 6 weeks  · May return to work/school as discussed with physician  · Discuss birth control with physician  · Breast care support bra worn at all times  · Lactation consultant referral number ( [phone] or [phone])    Call Your Healthcare Provider Right Away If You Have:  · A fever of 101°F or higher.  · Incisional drainage  · Heavy vaginal bleeding, clots, or vaginal discharge with foul odor.  · Redness, discharge, or pain worse than you had in the hospital.  · Burning, pain, red streaks, or lumpy areas in your breasts.  · Cracks, blisters, or blood on your nipples.  · Burning or pain when you urinate.  · Persistent nausea or vomiting.  · Severe headaches, blurred vision, dizziness or fainting.  · Feelings of extreme sadness or anxiety, or a feeling that you dont want to be with your baby.  · No bowel movement for 5 days.  · Redness, warmth, swelling, or pain in the lower leg.    Incision Care  · You will be able to shower and pat the incision dry.  · Watch your incision for signs of infection, such as increasing redness or drainage.  · For ease of movement, hold a pillow against the incision when you get up from a lying or sitting position, and when you laugh or cough.  · Avoid heavy lifting--nothing heavier than your baby until your doctor instructs you otherwise.       Follow-Up  Schedule a  follow-up exam with your healthcare provider for about 1-2 weeks after delivery. During this exam, your uterus and vaginal area will be checked. Contact your healthcare provider if you think you or your baby are having any problems.        Birth  ()   A  birth is the surgical delivery of a baby through an incision in the mothers abdomen.  births may be planned and scheduled. But, in many cases, a  is unexpected. In any case, a  is done to ensure the safest birth for both you and your baby.     Preparing for the Birth   The preparation for the birth is nearly the same whether scheduled or unscheduled. Surgery will begin shortly after you receive anesthesia. You will receive either regional or general anesthesia. Most cesareans are completed in less than an hour. During the birth, your healthcare team is with you, ready to take care of you and your . Your partner may also be with you for the birth     Making the Incisions   In a  birth, incisions are made in both the skin and the uterus. Either incision may be transverse (from side to side) or vertical. Your skin and uterine incisions may differ. Be sure they are noted in your health records.    The skin incision is usually transverse (side to side). It is located at the pubic hairline. A vertical incision may be used if youve had this incision before or if the  needs to be done quickly.    The uterine incision is almost always transverse. A transverse incision heals very well. This may allow for a future vaginal birth (). In certain cases, a vertical uterine incision may be made.           Your Babys Birth   Once the incisions are made, the doctor presses on the top of the uterus and guides the baby through the incision. The cord will be clamped and cut. Then the placenta is lifted out through the incision.   Taking Care of You   After your babys birth, the uterine incision is closed with stitches. Then, your skin incision will be closed with surgical staples or stitches and a dressing will be applied. Your doctor will press on your uterus. This helps expel blood clots through the vagina. You may be given medications to help shrink your  uterus and decrease bleeding. You may also receive antibiotics to reduce any risk of infection.     Taking Care of Your Baby   While your surgery is completed, your baby will be placed in an infant warmer. Gentle suction will be used to help remove excess fluid from the babys mouth and airways. Then the APGAR score will be done. This rates babys appearance (color), pulse (heart rate), grimace (muscle reflex), activity, and respiration. Your baby will be wrapped in a blanket and brought to you. Now, for the first time, youll see your .     Risks of    As with any surgery,  birth has risks. Your doctor will discuss the risks of  with you. They may include:    Bleeding    Infection    Injury to nearby organs    Reaction to anesthesia      © 9567-9722 [employer] [facility]. [address] [place] [address], [place] [address]. All rights reserved. This information is not intended as a substitute for professional medical care. Always follow your healthcare professional's instructions.        Discharge Instructions for  Section ()   You had a  section, or . During the , your baby was delivered through a surgical incision in your abdomen and uterus. Full recovery after a  can take time. Its important to take care of yourself--for your own sake and because your new baby needs you. Here are some guidelines to follow at home.     Incision Care    Shower as needed. Pat your incision dry.    Watch your incision for signs of infection, such as increasing redness or drainage.    Hold a pillow against the incision when you laugh or cough and when you get up from a lying or sitting position.    Remember, it can take as long as 6 weeks for a  incision to heal.    Activity   When to Call Your Doctor   Call your doctor right away if you have any of the following:    Fever of 100.4°F (38.0°C) or higher    Redness, pain, or drainage at  your incision site    Repeated clots of blood (the size of a quarter or larger) passing from the vagina    Bleeding that requires a new sanitary pad every hour    Severe pain in the abdomen    Pain or urgency with urination    Trouble urinating or emptying your bladder    No bowel movement within 1 week after the birth of your baby        Dont try to take care of anyone other than your baby and yourself.    Remember, the more active you are, the more likely you are to have an increase in your bleeding.    Get lots of rest. Take naps in the afternoon.    Increase your activities gradually.    Plan your activities so that you dont have to go up or down stairs more than necessary.    Do postsurgical deep breathing and coughing exercises. Ask your doctor for instructions.    Dont lift anything heavier than your baby until your doctor tells you its okay.    Dont drive until your doctor says its okay.    Dont have sexual intercourse until after youve had a follow-up appointment with your doctor and youve decided on a birth control method.    Dont take a tub bath or use douches or tampons for 4 weeks.    Follow-Up   Make a follow-up appointment as directed by our staff.     © 8055-0863 [employer]. [address] [place] [address], [place] [address]. All rights reserved. This information is not intended as a substitute for professional medical care. Always follow your healthcare professional's instructions.    After a    It can take time to recover fully after a . Its important to take care of yourself--both for your own sake and because your new baby needs you.     Incision care    You will probably be able to shower and pat the incision dry.    Watch your incision for signs of infection. These include redness that gets worse or fluid draining from it.    Hold a pillow against the incision when you get up from a lying or sitting position. Also do this when you laugh or  cough.    Avoid heavy lifting. Dont lift anything heavier than your baby until your doctor tells you otherwise.     When to call your health care provider   Call your health care provider if you have:    A fever of 100.4°F (38.0°C or higher)    Redness, pain, or discharge at the incision site that gets worse    Repeated clots the size of a quarter or larger, passing from your vagina    You need to use a new sanitary pad every hour because of vaginal bleeding    Severe pain in your abdomen    No bowel movement within one week after the birth of your baby      © 4797-8418 [employer]. [address] [place] [id number]. All rights reserved. This information is not intended as a substitute for professional medical care. Always follow your healthcare professional's instructions      Breast Care After Birth   A few days after your babys birth, your breasts will swell with milk. They are likely to feel tender and heavy. This is normal. To help prevent breast soreness and control irritation, follow these tips:       Coping with swelling    Use cold compresses or an ice pack to help reduce the ache or pain.    Breastfeed often to keep milk from clogging your breast ducts.    If your nipples are flat from breast swelling, hand express some milk. Squeeze out a few drops of milk by massaging and compressing your breasts.    If you have swelling with pain or fever, call your health care provider.  Preventing sore nipples    Make sure baby latches on to your breast correctly. The babys tongue should always be under your nipple, and your entire areola should be in the baby's mouth.    You can let milk dry on your nipples. This dried milk can protect the skin on your nipple/breasts.    Do not use alcohol, soap, or scented cleansers on your breasts. These can cause the nipples to dry and crack.    Do not wear nursing pads that are lined with plastic. They hold in moisture and can cause  chapping.    If you experience cracked or bleeding nipples, consult your doctor or a lactation consultant. He or she will ensure that your baby's latch is correct and may suggest topical treatment, such as pure lanolin.  Choosing a good bra   Wearing the right-sized bra is especially important now. If a bra is too tight, it may cause a duct in your breast to clog and become irritated. If possible, have a salesperson help fit you for a new bra. Look for one thats 100% cotton and comfortable. Also, choose a bra with wide straps that wont dig into your back and shoulders. If youre breastfeeding, find a nursing bra that allows you to uncover one breast at a time.   If you are not breastfeeding:    Avoid stimulation of nipples    Wear a tight-fitting bra    Apply ice packs for discomfort                  Pumping for the Baby in NICU    Preparation and Hygiene:  Shower daily.  Wear a clean bra each day and wash daily in warm soapy water.  1. Change wet or moist breast pads frequently.  Moist pads can promote growth of germs.  2. Actively wash your hands, paying close attention to the area around and under your fingernails, thoroughly with soap and water for 15 seconds before pumping or handling your milk.  Re-wash your hands if you touch anything (scratching your nose, answering the phone, etc) while pumping or handling your milk.   3. Before pumping your breasts, assemble the pump collection kit and have ready the sterile container and labels.  Place these items on a clean surface next to the breast pump.  4. Each time after you have finished pumping, take apart all of the parts of the breast pump collection kit and place them in a separate cleaning container (do not place them in the sink).  Be sure to remove the yellow valve from the breast shield and separate the white membrane from the yellow valve.  Rinse all of these parts with cool water.  Then use a new sponge and/or bottle brush and dishwashing detergent  to clean the parts.  Rinse off the soapy water with cool water and air dry on a clean towel covered with a clean cloth.  All parts may also be washed after each use in the top rack of a .  5. Once each day, sanitize all of the parts of the breast pump collection kit.  This can be done by boiling the kit parts for 10 minutes or by using a Quick Clean Micro-Steam Bag made by [employer]  6. If condensation appears in the tubing, continue to run the pump with the tubing attached for 1-2 minutes or until the tubing is dry.   7. Notify your babys nurse or doctor if you become ill or need to take any medication, even over-the-counter medicines.  Collection and Storage of Expressed Breast milk:       1. Pump your breasts at least 8-10 times every 24 hours.  Double pump (both breasts at the same time) for at least 15-20 minutes using the most suction that is comfortable.    2. Write the date and time of pumping and the name of any medications you are taking on the babys pre-printed hospital identification label.   3. Place your babys pre-printed hospital identification label on each container of breast milk.  Additional pre-printed labels can be obtained from your babys nurse.  If your expressed breast milk is not correctly labeled, the nurse cannot feed the milk to the baby.       4.    Do not touch the inside of the storage containers or lids.  5.        Pour the amount of expressed milk needed for 1 of your babys feedings into each storage container.  Use a new container(s) for each pumping.  Additional storage containers can be obtained from your babys nurse.  6. Tightly screw the lid onto the container and place immediately into the refrigerator for daily transportation to the hospital.   Do not freeze your milk unless asked to do so by your babys nurse.  However, if you are not able to visit your baby each day, place the expressed breast milk in the freezer.  7.     Expressed breast milk should be  refrigerated or frozen within 4 hours of pumping.  8.         Do not store expressed breast milk on the door of your refrigerator or freezer where the temperature is warmer.   Transportation of Expressed Breast milk:  1. Refrigerated breast milk or frozen milk should be packed tightly together in a cooler with frozen, gel-packs to keep the milk frozen.  DO NOT USE ICE CUBES (WET ICE) TO TRANSPORT FROZEN MILK.   A clean towel can be used to fill any extra space between containers of frozen milk.  2.    Bring your expressed milk from home each time you visit the baby.      Lactation Services [phone]

## 2020-04-05 NOTE — PROGRESS NOTES
Discharge teaching given to pt. With prescription education. Pt. verbalized understanding with good recall noted. Pt. enc. To call md office once discharged to schedule a f/u appt. and for any questions or concerns that may arise once discharged. No c/o pain at this time.    used for translation. Pt. Voiced all understanding.

## 2020-04-07 LAB
FINAL PATHOLOGIC DIAGNOSIS: NORMAL
GROSS: NORMAL

## 2020-04-10 ENCOUNTER — TELEPHONE (OUTPATIENT)
Dept: OBSTETRICS AND GYNECOLOGY | Facility: HOSPITAL | Age: 35
End: 2020-04-10

## 2020-04-10 NOTE — TELEPHONE ENCOUNTER
Lactation Telephone Follow up:  Spoke with patient. States she is breastfeeding infant, pumping and giving formula. States infant is waking to nurse every two hours. She alternates between breastfeeding and giving formula. She pumps after nursing but not sure exactly how much she gets out in EBM. She also gives EBM.    Denies any issues with latch or problems at breasts. States infant had 4 wet and dirty diapers yesterday and today has had 3 wet/dirty so far. Reinforced importance of watching output and appropriate amount of wet/dirty diapers.    States when she breastfeeds, the infant nurses about 10 minutes at each breasts and falls asleep. States breast feel softer and she hears infant swallowing. States when infant taking formula, she is taking 1 oz. Informed appropriate amount is between 1.5 -2 ounces and encouraged to increase to 1.5 ounce to see how infant tolerates.     Patient inquired about frequency of infant nursing. Reassured that infant should be nursing/feeding 8-12 times in 24 hours. Informed infant should not be sleeping more than 5-6 hours between feedings.     States infant has appointment with pediatrician on Monday. Reinforced importance of seeing pediatrician for weight and jaundice checks. Understanding voiced.     Patient denies any other questions. Encouraged to call lactation hotline PRN for concerns. Understanding voiced.

## 2020-04-13 ENCOUNTER — POSTPARTUM VISIT (OUTPATIENT)
Dept: OBSTETRICS AND GYNECOLOGY | Facility: CLINIC | Age: 35
End: 2020-04-13
Payer: MEDICAID

## 2020-04-13 VITALS
BODY MASS INDEX: 32.78 KG/M2 | WEIGHT: 185 LBS | SYSTOLIC BLOOD PRESSURE: 113 MMHG | HEIGHT: 63 IN | DIASTOLIC BLOOD PRESSURE: 70 MMHG

## 2020-04-13 DIAGNOSIS — Z09 POSTOPERATIVE EXAMINATION: ICD-10-CM

## 2020-04-13 DIAGNOSIS — Z98.891 S/P REPEAT LOW TRANSVERSE C-SECTION: Primary | ICD-10-CM

## 2020-04-13 PROBLEM — O34.219 HISTORY OF CESAREAN SECTION COMPLICATING PREGNANCY: Status: RESOLVED | Noted: 2020-02-10 | Resolved: 2020-04-13

## 2020-04-13 PROCEDURE — 99999 PR PBB SHADOW E&M-EST. PATIENT-LVL III: ICD-10-PCS | Mod: PBBFAC,,, | Performed by: OBSTETRICS & GYNECOLOGY

## 2020-04-13 PROCEDURE — 59430 PR CARE AFTER DELIVERY ONLY: ICD-10-PCS | Mod: ,,, | Performed by: OBSTETRICS & GYNECOLOGY

## 2020-04-13 PROCEDURE — 99999 PR PBB SHADOW E&M-EST. PATIENT-LVL III: CPT | Mod: PBBFAC,,, | Performed by: OBSTETRICS & GYNECOLOGY

## 2020-04-13 PROCEDURE — 99213 OFFICE O/P EST LOW 20 MIN: CPT | Mod: PBBFAC | Performed by: OBSTETRICS & GYNECOLOGY

## 2020-04-13 NOTE — ANESTHESIA POSTPROCEDURE EVALUATION
Anesthesia Post Evaluation    Patient: Marcela King    Procedure(s) Performed: Procedure(s) (LRB):   SECTION (N/A)    Final Anesthesia Type: epidural    Patient location during evaluation: labor & delivery  Patient participation: Yes- Able to Participate  Level of consciousness: awake and alert and oriented  Post-procedure vital signs: reviewed and stable  Pain management: adequate  Airway patency: patent    PONV status at discharge: No PONV  Anesthetic complications: no      Cardiovascular status: blood pressure returned to baseline, stable and hemodynamically stable  Respiratory status: unassisted, room air and spontaneous ventilation  Hydration status: euvolemic  Follow-up not needed.          Vitals Value Taken Time   /69 2020 0747 AM   Temp  36.3 C (97.4 F) 2020 0747 AM   Pulse 84 2020 0747 AM   Resp 18 2020 0747 AM   SpO2 97 2020 0747 AM         No case tracking events are documented in the log.      Pain/Reymundo Score: No data recorded

## 2020-04-13 NOTE — PROGRESS NOTES
"Cc:  Postop check    HPI:  34 yor G2 now  who delivered at 36 wks via LTCS due to SROM and fetal intolerance of labor on 4/3/20.  Csec done by Dr. Shaikh.   Pt is doing well.  Pt is both breast and bottle feeding.  Pain is minimal.  Pt denies any urinary or bowel probs.    Vitals:    04/13/20 1044   BP: 113/70   Weight: 83.9 kg (185 lb)   Height: 5' 3" (1.6 m)     Abd:  Soft NTND  Wound:  aquacel removed, wound appears to be healing well    Assessment/Plan  Postop check - f/u 4 wks for pp exam  "

## 2021-07-01 ENCOUNTER — PATIENT MESSAGE (OUTPATIENT)
Dept: ADMINISTRATIVE | Facility: OTHER | Age: 36
End: 2021-07-01

## (undated) DEVICE — PAD ABD 8X10 STERILE